# Patient Record
Sex: FEMALE | Race: ASIAN | NOT HISPANIC OR LATINO | Employment: FULL TIME | URBAN - METROPOLITAN AREA
[De-identification: names, ages, dates, MRNs, and addresses within clinical notes are randomized per-mention and may not be internally consistent; named-entity substitution may affect disease eponyms.]

---

## 2024-05-09 ENCOUNTER — ANCILLARY PROCEDURE (OUTPATIENT)
Dept: MATERNAL FETAL MEDICINE | Facility: MEDICAL CENTER | Age: 39
End: 2024-05-09
Attending: OBSTETRICS & GYNECOLOGY
Payer: MEDICAID

## 2024-05-09 VITALS
DIASTOLIC BLOOD PRESSURE: 68 MMHG | BODY MASS INDEX: 25.42 KG/M2 | WEIGHT: 143.5 LBS | SYSTOLIC BLOOD PRESSURE: 105 MMHG | HEART RATE: 72 BPM

## 2024-05-09 DIAGNOSIS — O09.899 SUPERVISION OF OTHER HIGH RISK PREGNANCIES, UNSPECIFIED TRIMESTER: ICD-10-CM

## 2024-05-09 DIAGNOSIS — O09.529 SUPERVISION OF MULTIGRAVIDA OF ADVANCED MATERNAL AGE, ANTEPARTUM: ICD-10-CM

## 2024-05-09 PROCEDURE — 76815 OB US LIMITED FETUS(S): CPT | Performed by: OBSTETRICS & GYNECOLOGY

## 2024-05-28 ENCOUNTER — HOSPITAL ENCOUNTER (INPATIENT)
Facility: MEDICAL CENTER | Age: 39
End: 2024-05-28
Attending: OBSTETRICS & GYNECOLOGY | Admitting: OBSTETRICS & GYNECOLOGY
Payer: MEDICAID

## 2024-05-28 ENCOUNTER — APPOINTMENT (OUTPATIENT)
Dept: RADIOLOGY | Facility: MEDICAL CENTER | Age: 39
DRG: 832 | End: 2024-05-28
Attending: OBSTETRICS & GYNECOLOGY
Payer: MEDICAID

## 2024-05-28 LAB
ALBUMIN SERPL BCP-MCNC: 3.7 G/DL (ref 3.2–4.9)
ALBUMIN/GLOB SERPL: 1 G/DL
ALP SERPL-CCNC: 63 U/L (ref 30–99)
ALT SERPL-CCNC: 7 U/L (ref 2–50)
ANION GAP SERPL CALC-SCNC: 13 MMOL/L (ref 7–16)
APPEARANCE UR: CLEAR
AST SERPL-CCNC: 10 U/L (ref 12–45)
BILIRUB SERPL-MCNC: 0.2 MG/DL (ref 0.1–1.5)
BUN SERPL-MCNC: 7 MG/DL (ref 8–22)
CALCIUM ALBUM COR SERPL-MCNC: 10.1 MG/DL (ref 8.5–10.5)
CALCIUM SERPL-MCNC: 9.9 MG/DL (ref 8.5–10.5)
CHLORIDE SERPL-SCNC: 103 MMOL/L (ref 96–112)
CO2 SERPL-SCNC: 19 MMOL/L (ref 20–33)
COLOR UR AUTO: YELLOW
CREAT SERPL-MCNC: 0.35 MG/DL (ref 0.5–1.4)
ERYTHROCYTE [DISTWIDTH] IN BLOOD BY AUTOMATED COUNT: 46.2 FL (ref 35.9–50)
GFR SERPLBLD CREATININE-BSD FMLA CKD-EPI: 134 ML/MIN/1.73 M 2
GLOBULIN SER CALC-MCNC: 3.8 G/DL (ref 1.9–3.5)
GLUCOSE SERPL-MCNC: 78 MG/DL (ref 65–99)
GLUCOSE UR QL STRIP.AUTO: NEGATIVE MG/DL
GP B STREP DNA SPEC QL NAA+PROBE: NEGATIVE
HCT VFR BLD AUTO: 37.9 % (ref 37–47)
HGB BLD-MCNC: 13 G/DL (ref 12–16)
HOLDING TUBE BB 8507: NORMAL
KETONES UR QL STRIP.AUTO: NEGATIVE MG/DL
LEUKOCYTE ESTERASE UR QL STRIP.AUTO: NEGATIVE
MCH RBC QN AUTO: 32.7 PG (ref 27–33)
MCHC RBC AUTO-ENTMCNC: 34.3 G/DL (ref 32.2–35.5)
MCV RBC AUTO: 95.5 FL (ref 81.4–97.8)
NITRITE UR QL STRIP.AUTO: NEGATIVE
PH UR STRIP.AUTO: 7 [PH] (ref 5–8)
PLATELET # BLD AUTO: 307 K/UL (ref 164–446)
PMV BLD AUTO: 9.4 FL (ref 9–12.9)
POTASSIUM SERPL-SCNC: 4.1 MMOL/L (ref 3.6–5.5)
PROT SERPL-MCNC: 7.5 G/DL (ref 6–8.2)
PROT UR QL STRIP: NEGATIVE MG/DL
RBC # BLD AUTO: 3.97 M/UL (ref 4.2–5.4)
RBC UR QL AUTO: ABNORMAL
SODIUM SERPL-SCNC: 135 MMOL/L (ref 135–145)
SP GR UR STRIP.AUTO: 1.01 (ref 1–1.03)
WBC # BLD AUTO: 10.2 K/UL (ref 4.8–10.8)

## 2024-05-28 PROCEDURE — 76817 TRANSVAGINAL US OBSTETRIC: CPT

## 2024-05-28 PROCEDURE — 770002 HCHG ROOM/CARE - OB PRIVATE (112)

## 2024-05-28 PROCEDURE — 87653 STREP B DNA AMP PROBE: CPT

## 2024-05-28 PROCEDURE — 36415 COLL VENOUS BLD VENIPUNCTURE: CPT

## 2024-05-28 PROCEDURE — 99254 IP/OBS CNSLTJ NEW/EST MOD 60: CPT | Performed by: OBSTETRICS & GYNECOLOGY

## 2024-05-28 PROCEDURE — 85027 COMPLETE CBC AUTOMATED: CPT

## 2024-05-28 PROCEDURE — 87081 CULTURE SCREEN ONLY: CPT

## 2024-05-28 PROCEDURE — 99222 1ST HOSP IP/OBS MODERATE 55: CPT | Performed by: OBSTETRICS & GYNECOLOGY

## 2024-05-28 PROCEDURE — 700111 HCHG RX REV CODE 636 W/ 250 OVERRIDE (IP): Performed by: OBSTETRICS & GYNECOLOGY

## 2024-05-28 PROCEDURE — 87150 DNA/RNA AMPLIFIED PROBE: CPT

## 2024-05-28 PROCEDURE — 302790 HCHG STAT ANTEPARTUM CARE, DAILY

## 2024-05-28 PROCEDURE — 96372 THER/PROPH/DIAG INJ SC/IM: CPT

## 2024-05-28 PROCEDURE — 81002 URINALYSIS NONAUTO W/O SCOPE: CPT

## 2024-05-28 PROCEDURE — 80053 COMPREHEN METABOLIC PANEL: CPT

## 2024-05-28 RX ORDER — BETAMETHASONE SODIUM PHOSPHATE AND BETAMETHASONE ACETATE 3; 3 MG/ML; MG/ML
12 INJECTION, SUSPENSION INTRA-ARTICULAR; INTRALESIONAL; INTRAMUSCULAR; SOFT TISSUE EVERY 24 HOURS
Status: COMPLETED | OUTPATIENT
Start: 2024-05-28 | End: 2024-05-29

## 2024-05-28 RX ORDER — VITAMIN A ACETATE, BETA CAROTENE, ASCORBIC ACID, CHOLECALCIFEROL, .ALPHA.-TOCOPHEROL ACETATE, DL-, THIAMINE MONONITRATE, RIBOFLAVIN, NIACINAMIDE, PYRIDOXINE HYDROCHLORIDE, FOLIC ACID, CYANOCOBALAMIN, CALCIUM CARBONATE, FERROUS FUMARATE, ZINC OXIDE, CUPRIC OXIDE 3080; 12; 120; 400; 1; 1.84; 3; 20; 22; 920; 25; 200; 27; 10; 2 [IU]/1; UG/1; MG/1; [IU]/1; MG/1; MG/1; MG/1; MG/1; MG/1; [IU]/1; MG/1; MG/1; MG/1; MG/1; MG/1
1 TABLET, FILM COATED ORAL
Status: DISCONTINUED | OUTPATIENT
Start: 2024-05-28 | End: 2024-05-28

## 2024-05-28 RX ORDER — ERGOCALCIFEROL 1.25 MG/1
CAPSULE, LIQUID FILLED ORAL DAILY
COMMUNITY

## 2024-05-28 RX ORDER — VITAMIN A ACETATE, BETA CAROTENE, ASCORBIC ACID, CHOLECALCIFEROL, .ALPHA.-TOCOPHEROL ACETATE, DL-, THIAMINE MONONITRATE, RIBOFLAVIN, NIACINAMIDE, PYRIDOXINE HYDROCHLORIDE, FOLIC ACID, CYANOCOBALAMIN, CALCIUM CARBONATE, FERROUS FUMARATE, ZINC OXIDE, CUPRIC OXIDE 3080; 12; 120; 400; 1; 1.84; 3; 20; 22; 920; 25; 200; 27; 10; 2 [IU]/1; UG/1; MG/1; [IU]/1; MG/1; MG/1; MG/1; MG/1; MG/1; [IU]/1; MG/1; MG/1; MG/1; MG/1; MG/1
1 TABLET, FILM COATED ORAL
Status: CANCELLED | OUTPATIENT
Start: 2024-05-28

## 2024-05-28 RX ORDER — VITAMIN A ACETATE, BETA CAROTENE, ASCORBIC ACID, CHOLECALCIFEROL, .ALPHA.-TOCOPHEROL ACETATE, DL-, THIAMINE MONONITRATE, RIBOFLAVIN, NIACINAMIDE, PYRIDOXINE HYDROCHLORIDE, FOLIC ACID, CYANOCOBALAMIN, CALCIUM CARBONATE, FERROUS FUMARATE, ZINC OXIDE, CUPRIC OXIDE 3080; 12; 120; 400; 1; 1.84; 3; 20; 22; 920; 25; 200; 27; 10; 2 [IU]/1; UG/1; MG/1; [IU]/1; MG/1; MG/1; MG/1; MG/1; MG/1; [IU]/1; MG/1; MG/1; MG/1; MG/1; MG/1
1 TABLET, FILM COATED ORAL
Status: DISCONTINUED | OUTPATIENT
Start: 2024-05-29 | End: 2024-06-01 | Stop reason: HOSPADM

## 2024-05-28 RX ORDER — PROGESTERONE 100 MG/1
100 CAPSULE ORAL EVERY EVENING
Status: CANCELLED | OUTPATIENT
Start: 2024-05-28

## 2024-05-28 RX ADMIN — BETAMETHASONE SODIUM PHOSPHATE AND BETAMETHASONE ACETATE 12 MG: 3; 3 INJECTION, SUSPENSION INTRA-ARTICULAR; INTRALESIONAL; INTRAMUSCULAR at 15:35

## 2024-05-28 ASSESSMENT — PATIENT HEALTH QUESTIONNAIRE - PHQ9
2. FEELING DOWN, DEPRESSED, IRRITABLE, OR HOPELESS: NOT AT ALL
1. LITTLE INTEREST OR PLEASURE IN DOING THINGS: NOT AT ALL
SUM OF ALL RESPONSES TO PHQ9 QUESTIONS 1 AND 2: 0
2. FEELING DOWN, DEPRESSED, IRRITABLE, OR HOPELESS: NOT AT ALL
SUM OF ALL RESPONSES TO PHQ9 QUESTIONS 1 AND 2: 0
1. LITTLE INTEREST OR PLEASURE IN DOING THINGS: NOT AT ALL

## 2024-05-28 ASSESSMENT — LIFESTYLE VARIABLES
TOTAL SCORE: 0
HOW MANY TIMES IN THE PAST YEAR HAVE YOU HAD 5 OR MORE DRINKS IN A DAY: 0
EVER_SMOKED: NEVER
TOTAL SCORE: 0
CONSUMPTION TOTAL: NEGATIVE
EVER FELT BAD OR GUILTY ABOUT YOUR DRINKING: NO
ALCOHOL_USE: NO
EVER HAD A DRINK FIRST THING IN THE MORNING TO STEADY YOUR NERVES TO GET RID OF A HANGOVER: NO
DOES PATIENT WANT TO STOP DRINKING: NO
ON A TYPICAL DAY WHEN YOU DRINK ALCOHOL HOW MANY DRINKS DO YOU HAVE: 0
HAVE PEOPLE ANNOYED YOU BY CRITICIZING YOUR DRINKING: NO
HAVE YOU EVER FELT YOU SHOULD CUT DOWN ON YOUR DRINKING: NO
TOTAL SCORE: 0
AVERAGE NUMBER OF DAYS PER WEEK YOU HAVE A DRINK CONTAINING ALCOHOL: 0

## 2024-05-28 ASSESSMENT — PAIN DESCRIPTION - PAIN TYPE
TYPE: ACUTE PAIN

## 2024-05-28 NOTE — PROGRESS NOTES
", 25.4 weeks (EDC ) presents to Labor and Delivery with c/o vaginal bleeding since  night. Pt reports bleeding has been \"on and off\" and has varied in color from bright red to brown. She states she has been using panty-liners and was having to change them frequently over the last two days. She reports that the bleeding is more mild today. Small amount of brown spotting was observed on toilet tissue by this RN. Pt reports +FM. She reports some cramping on R side of abdomen and some mild back pain. She reports no LOF aside from the VB. Pt has hx of two pre-term deliveries, one at 26 weeks and the other at 34 weeks. She reports taking nightly progesterone, states no known complications with this pregnancy. Monitors applied x2. VSS.      1139: Report given to Dr. Early, orders received for US.  1230: US at bedside.  1318: US complete, monitors applied x2.   1430: Dr. Early updated regarding US findings.   1449: Orders for admission received.  1855: Report given to ISHAN Ramirez.     "

## 2024-05-29 LAB
ALBUMIN SERPL BCP-MCNC: 3.9 G/DL (ref 3.2–4.9)
ALBUMIN/GLOB SERPL: 1 G/DL
ALP SERPL-CCNC: 62 U/L (ref 30–99)
ALT SERPL-CCNC: 10 U/L (ref 2–50)
ANION GAP SERPL CALC-SCNC: 14 MMOL/L (ref 7–16)
AST SERPL-CCNC: 12 U/L (ref 12–45)
BILIRUB SERPL-MCNC: <0.2 MG/DL (ref 0.1–1.5)
BUN SERPL-MCNC: 9 MG/DL (ref 8–22)
CALCIUM ALBUM COR SERPL-MCNC: 10 MG/DL (ref 8.5–10.5)
CALCIUM SERPL-MCNC: 9.9 MG/DL (ref 8.5–10.5)
CHLORIDE SERPL-SCNC: 100 MMOL/L (ref 96–112)
CO2 SERPL-SCNC: 19 MMOL/L (ref 20–33)
CREAT SERPL-MCNC: 0.34 MG/DL (ref 0.5–1.4)
ERYTHROCYTE [DISTWIDTH] IN BLOOD BY AUTOMATED COUNT: 45.3 FL (ref 35.9–50)
GFR SERPLBLD CREATININE-BSD FMLA CKD-EPI: 135 ML/MIN/1.73 M 2
GLOBULIN SER CALC-MCNC: 3.8 G/DL (ref 1.9–3.5)
GLUCOSE SERPL-MCNC: 92 MG/DL (ref 65–99)
GP B STREP DNA SPEC QL NAA+PROBE: NEGATIVE
HCT VFR BLD AUTO: 38.2 % (ref 37–47)
HGB BLD-MCNC: 13.1 G/DL (ref 12–16)
MAGNESIUM SERPL-MCNC: 5.5 MG/DL (ref 1.5–2.5)
MAGNESIUM SERPL-MCNC: 6 MG/DL (ref 1.5–2.5)
MCH RBC QN AUTO: 32.8 PG (ref 27–33)
MCHC RBC AUTO-ENTMCNC: 34.3 G/DL (ref 32.2–35.5)
MCV RBC AUTO: 95.5 FL (ref 81.4–97.8)
PLATELET # BLD AUTO: 331 K/UL (ref 164–446)
PMV BLD AUTO: 9.2 FL (ref 9–12.9)
POTASSIUM SERPL-SCNC: 4.2 MMOL/L (ref 3.6–5.5)
PROT SERPL-MCNC: 7.7 G/DL (ref 6–8.2)
RBC # BLD AUTO: 4 M/UL (ref 4.2–5.4)
SODIUM SERPL-SCNC: 133 MMOL/L (ref 135–145)
WBC # BLD AUTO: 16.4 K/UL (ref 4.8–10.8)

## 2024-05-29 PROCEDURE — 700111 HCHG RX REV CODE 636 W/ 250 OVERRIDE (IP): Mod: JZ | Performed by: OBSTETRICS & GYNECOLOGY

## 2024-05-29 PROCEDURE — 80053 COMPREHEN METABOLIC PANEL: CPT

## 2024-05-29 PROCEDURE — 700105 HCHG RX REV CODE 258: Performed by: OBSTETRICS & GYNECOLOGY

## 2024-05-29 PROCEDURE — 96372 THER/PROPH/DIAG INJ SC/IM: CPT

## 2024-05-29 PROCEDURE — 700111 HCHG RX REV CODE 636 W/ 250 OVERRIDE (IP): Performed by: OBSTETRICS & GYNECOLOGY

## 2024-05-29 PROCEDURE — 85027 COMPLETE CBC AUTOMATED: CPT

## 2024-05-29 PROCEDURE — 302790 HCHG STAT ANTEPARTUM CARE, DAILY

## 2024-05-29 PROCEDURE — 700102 HCHG RX REV CODE 250 W/ 637 OVERRIDE(OP): Performed by: OBSTETRICS & GYNECOLOGY

## 2024-05-29 PROCEDURE — 36415 COLL VENOUS BLD VENIPUNCTURE: CPT

## 2024-05-29 PROCEDURE — A9270 NON-COVERED ITEM OR SERVICE: HCPCS | Performed by: OBSTETRICS & GYNECOLOGY

## 2024-05-29 PROCEDURE — 96375 TX/PRO/DX INJ NEW DRUG ADDON: CPT

## 2024-05-29 PROCEDURE — 770002 HCHG ROOM/CARE - OB PRIVATE (112)

## 2024-05-29 PROCEDURE — 59025 FETAL NON-STRESS TEST: CPT | Mod: 26 | Performed by: OBSTETRICS & GYNECOLOGY

## 2024-05-29 PROCEDURE — 83735 ASSAY OF MAGNESIUM: CPT | Mod: 91

## 2024-05-29 PROCEDURE — 99232 SBSQ HOSP IP/OBS MODERATE 35: CPT | Mod: 25 | Performed by: OBSTETRICS & GYNECOLOGY

## 2024-05-29 PROCEDURE — 96365 THER/PROPH/DIAG IV INF INIT: CPT

## 2024-05-29 PROCEDURE — 96366 THER/PROPH/DIAG IV INF ADDON: CPT

## 2024-05-29 RX ORDER — SODIUM CHLORIDE, SODIUM LACTATE, POTASSIUM CHLORIDE, CALCIUM CHLORIDE 600; 310; 30; 20 MG/100ML; MG/100ML; MG/100ML; MG/100ML
INJECTION, SOLUTION INTRAVENOUS CONTINUOUS
Status: DISCONTINUED | OUTPATIENT
Start: 2024-05-29 | End: 2024-06-01 | Stop reason: HOSPADM

## 2024-05-29 RX ORDER — MAGNESIUM SULFATE HEPTAHYDRATE 40 MG/ML
2 INJECTION, SOLUTION INTRAVENOUS CONTINUOUS
Status: DISCONTINUED | OUTPATIENT
Start: 2024-05-29 | End: 2024-05-29

## 2024-05-29 RX ORDER — CALCIUM GLUCONATE 94 MG/ML
1 INJECTION, SOLUTION INTRAVENOUS
Status: DISCONTINUED | OUTPATIENT
Start: 2024-05-29 | End: 2024-06-01 | Stop reason: HOSPADM

## 2024-05-29 RX ORDER — ONDANSETRON 2 MG/ML
4 INJECTION INTRAMUSCULAR; INTRAVENOUS EVERY 4 HOURS PRN
Status: DISCONTINUED | OUTPATIENT
Start: 2024-05-29 | End: 2024-06-01 | Stop reason: HOSPADM

## 2024-05-29 RX ORDER — MAGNESIUM SULFATE HEPTAHYDRATE 40 MG/ML
4 INJECTION, SOLUTION INTRAVENOUS ONCE
Status: COMPLETED | OUTPATIENT
Start: 2024-05-29 | End: 2024-05-29

## 2024-05-29 RX ORDER — MAGNESIUM SULFATE HEPTAHYDRATE 40 MG/ML
1 INJECTION, SOLUTION INTRAVENOUS CONTINUOUS
Status: DISPENSED | OUTPATIENT
Start: 2024-05-29 | End: 2024-05-30

## 2024-05-29 RX ADMIN — MAGNESIUM SULFATE HEPTAHYDRATE 4 G: 4 INJECTION, SOLUTION INTRAVENOUS at 09:01

## 2024-05-29 RX ADMIN — ONDANSETRON 4 MG: 2 INJECTION INTRAMUSCULAR; INTRAVENOUS at 09:23

## 2024-05-29 RX ADMIN — SODIUM CHLORIDE, POTASSIUM CHLORIDE, SODIUM LACTATE AND CALCIUM CHLORIDE: 600; 310; 30; 20 INJECTION, SOLUTION INTRAVENOUS at 08:54

## 2024-05-29 RX ADMIN — BETAMETHASONE SODIUM PHOSPHATE AND BETAMETHASONE ACETATE 12 MG: 3; 3 INJECTION, SUSPENSION INTRA-ARTICULAR; INTRALESIONAL; INTRAMUSCULAR at 15:30

## 2024-05-29 RX ADMIN — MAGNESIUM SULFATE IN WATER 2 G/HR: 40 INJECTION, SOLUTION INTRAVENOUS at 09:23

## 2024-05-29 RX ADMIN — MAGNESIUM SULFATE IN WATER 2.5 G/HR: 40 INJECTION, SOLUTION INTRAVENOUS at 17:14

## 2024-05-29 RX ADMIN — SODIUM CHLORIDE, POTASSIUM CHLORIDE, SODIUM LACTATE AND CALCIUM CHLORIDE: 600; 310; 30; 20 INJECTION, SOLUTION INTRAVENOUS at 22:31

## 2024-05-29 RX ADMIN — PRENATAL WITH FERROUS FUM AND FOLIC ACID 1 TABLET: 3080; 920; 120; 400; 22; 1.84; 3; 20; 10; 1; 12; 200; 27; 25; 2 TABLET ORAL at 07:32

## 2024-05-29 ASSESSMENT — PATIENT HEALTH QUESTIONNAIRE - PHQ9
2. FEELING DOWN, DEPRESSED, IRRITABLE, OR HOPELESS: NOT AT ALL
1. LITTLE INTEREST OR PLEASURE IN DOING THINGS: NOT AT ALL
SUM OF ALL RESPONSES TO PHQ9 QUESTIONS 1 AND 2: 0

## 2024-05-29 ASSESSMENT — PAIN DESCRIPTION - PAIN TYPE
TYPE: ACUTE PAIN
TYPE: ACUTE PAIN

## 2024-05-29 NOTE — PROGRESS NOTES
0700- Bedside report received from ISHAN Ramirez. POC discussed. Patient denies pain or needs at this time.     0725- Assessment completed, patient reports vaginal spotting and light vaginal bleeding that is bright red when she wipes after getting up to use the restroom. Patient denies uterine contractions, denies LOF, endorses positive fetal movement. Encouraged to update RN with changes in status and increases in vaginal bleeding. Verbalized understanding. EFM & toco adjusted.     0830- Late entry due to patient care. Orders received to begin magnesium sulfate for neuroprotection, patient updated and verbalized understanding and agreement with plan. See eMar. Whitlock inserted per flowsheets, SCDs placed.    0990- Dr. Donovan updated via telephone, will contact NICU for consult.    1914- Bedside report given to ISHAN Ramirez.

## 2024-05-29 NOTE — H&P
DATE OF ADMISSION:  2024     OB-GYN ADMISSION HISTORY AND PHYSICAL     IDENTIFICATION:  This is a 39-year-old  6, para 1-2-2-3 with an EDC of   2024 and a presenting EGA of 25 and 4/7th weeks who has a chief   complaint of vaginal bleeding.     HISTORY OF PRESENT ILLNESS:  This is a patient of Dr. Robbins who has   gotten good prenatal care.  She is also seen by Dr. Jones.  She presented on the    complaining of vaginal bleeding since , bright red in nature at   times then dark brown.  She was concerned, subsequently, came to labor and   delivery.  She has been on progesterone during the course of her pregnancy and   cervical length has been followed by Dr. Jones.  Her most recent cervical   length was 3.7 cm.  She denies spontaneous rupture of membranes.  Denies   uterine contractions.  The patient has a history of 2 miscarriages, one in    and  as well as   in  at 26 weeks,     in  at 34 weeks and a term  in .  Ultrasound on the    shows vertex presentation, fetus 756 grams with a 2.41 cm transvaginal   length with some funneling and fluid in the endocervical canal as well as a   placental lake.  The patient has no other complaints.  Denies nausea,   vomiting, fever, chills, change in bowel or bladder habits.  She admits to   good fetal movement.  Denies symptoms of PIH.  She does state that she has a   bicornuate uterus and she carries BRCA gene.  She has never had genetics for   this carrier status.  The patient had gestational diabetes with prior   pregnancy.  She had an early elevated 1-hour, normal 3-hour with this   pregnancy and is waiting to repeat her one-hour with this pregnancy.     OBSTETRICAL HISTORY:  2005 spontaneous ; 2007 spontaneous ;   , 26 week ; , 34 week ;  full term  and   the current pregnancy.     GYNECOLOGIC HISTORY:  Bicornuate uterus:      MEDICAL HISTORY:    ALLERGIES:  No known drug allergies.     CURRENT MEDICATIONS:  Vitamin D, prenatal vitamins vaginal progesterone.     MEDICAL PROBLEMS:  BRCA carrier.     SURGICAL HISTORY:   x3.     SOCIAL HISTORY:  Denies alcohol, tobacco or drug abuse.     FAMILY HISTORY:  Noncontributory.     REVIEW OF SYSTEMS:  Times 12 is negative per AMA standards available in chart.     LABORATORY DATA:  Beta strep is negative.  She is A positive, rubella immune.    She declined genetics.  She had an elevated early 1-hour, normal 3-hour,   second 1-hour is pending.     PHYSICAL EXAMINATION:    VITAL SIGNS:  The patient is afebrile.  Vital signs within normal limits.    Fetal heart tracings appropriate for gestational age with moderate variability   in the 130s.  GENERAL:  She is awake, alert, in no apparent distress.  NECK:  Supple.  HEART:  Regular.  CHEST:  Clear.  BREASTS:  Symmetrical.  ABDOMEN:  Soft, gravid, size appropriate dates.  EXTREMITIES:  Negative.  GYNECOLOGIC:  EGBUS within normal limits.  No perineal lesions.  Vagina is   pink and moist.  Cervix is midline closed, 70% effaced, -3 station, head   presenting.     ASSESSMENT:  At this time,  1.  A 39-year-old  6, para 1-2-2-3 at 25 and 4/7th weeks.  2.  Vaginal bleeding with shortened cervix.  3.  Placental lake, rule out abruption.  4.  Fetal status reassuring.  5.  Bicornuate uterus.  6.  History of  labor and delivery x2.  7.  Prior  x3.  8.  BRCA gene carrier.     PLAN:  At this time, we discussed the case with Dr. Jones.  He recommends a   course of steroids, continuous monitoring and observation, mag for neuro   protection with onset of uterine contractions with possible discharge home in   her steroid window if she no longer bleeding with close followup.           ______________________________  MD ROD Best/RAMEZ    DD:  2024 06:31  DT:  2024 07:29    Job#:  [id number]

## 2024-05-29 NOTE — CARE PLAN
The patient is Stable - Low risk of patient condition declining or worsening    Shift Goals  Clinical Goals: Pt to remain free from s/s PTL, remain pregnant  Patient Goals: Remain pregnant  Family Goals: Support    Progress made toward(s) clinical / shift goals:    Problem: Knowledge Deficit - L&D  Goal: Patient and family/caregivers will demonstrate understanding of plan of care, disease process/condition, diagnostic tests and medications  Outcome: Progressing  Problem: Psychosocial - L&D  Goal: Patient's level of anxiety will decrease  Outcome: Progressing  Pt updated regarding plan of care for admission. All questions and concerns addressed at this time.    Problem: Pain  Goal: Patient's pain will be alleviated or reduced to the patient’s comfort goal  Outcome: Progressing  Pt reports no pain at this time. Encouraged to call RN if she feels cramping has returned.     Patient is not progressing towards the following goals: NA

## 2024-05-29 NOTE — CONSULTS
NICU Consult    I was asked to consult on Ms. Nichols today. She is a 39 year old  at 25 weeks and 5 days gestation whose pregnancy was complicated by suspected placental abruption and uterine contractions. I met with Ms. Nichols and she is expecting a baby boy. EFW of 756g. Obstretical plan for betamethasone (, ) and magnesium. Of note, she had a prior 26 week and 34 week infant.     Today we discussed the average length of stay in the NICU, the differences in delivery room management for a ELBW premature infant, the need for likely mechanical ventilation for significant respiratory support, the overall nutritional strategy for infant's of this gestational age, including TPN via umbilical catheters/PICC lines as well as slow ramping up of enteral feeds. Mother does plan to breastfeed, which I supported and encouraged, and we discussed the importance of breast milk/donor breastmilk to premature infants.    Additionally, we discussed the overall immaturity of infants born at this age and the monitoring that would take place for the immature brain, eyes, lungs, intestines, and overall neurodevelopment, as ELBW infants are at increased risk for cognitive delays, CP, attention issues, and other developmental problems, that cannot be predicted at this juncture. We also talked about the overall survival for an infant born at this gestational age following steriods. I informed them that this survival rate increases with increasing gestational age. We also discussed some statistics for survival without serious neurologic sequale.     All questions were answered. ?     NICU remains available should further questions or concerns arise.    Sandrine Yeboah MD  Neonatology

## 2024-05-29 NOTE — PROGRESS NOTES
"  Labor and Delivery Antepartum Note    ID: 39 y.o. is a  with IUP at 25w5d    Primary Obstetrician: Sandrine Montenegro M.D.    Assessing Obstetrician: Sandrine Montenegro MD    S: Pt feels well. States no painful contractions but occas feels discomfort on the sides of her lower abdomen. Last vaginal bleeding this morning was bright red and brown, small amount of spotting on pad. No LOF. Baby moving well. Questions answered about her current condition and plan of care.    ROS: 10 systems negative except as noted above.    O: /58   Pulse 76   Temp 36 °C (96.8 °F) (Temporal)   Resp 18   Ht 1.6 m (5' 3\")   Wt 66.2 kg (146 lb)   SpO2 97%   BMI 25.86 kg/m²    Gen: NAD, AAO  HEENT: NC/AT, MMM  Neck: supple  Abd: Gravid, soft, uterus NTTP, no rebound/guarding  Ext: WWP, 2+ DPP, no edema  Skin: no visible rash  Psy: mood good, affect normal and congruent  Pelvic: SVE deferred    NST Report Review:  NST: 120-130, pos accels, moderate variability, no decels, reactive, cat 1  [place]: q 3 min -> placed on mag -> now irregular off and on and Q 7 min off and on    Recent Labs     24  1530   WBC 10.2   RBC 3.97*   HEMOGLOBIN 13.0   HEMATOCRIT 37.9   MCV 95.5   MCH 32.7   RDW 46.2   PLATELETCT 307   MPV 9.4       Assessment:   Raisa Nichols is a 39 y.o.  at 25w5d.    Vaginal bleeding - probable marginal abruption  Cervical shortening and funneling  Hx of  birth x 2 at 26 and 34 weeks  Prior  x 3  Bicornuate uterus  Reactive NST  BRCA 2 carrier  AMA    Plan:  Continue antepartum mgmt  Mag sulfate for tocolysis through BMZ window  Pt is s/p BMZ x 1, due for 2nd dose today  NICU consult called today, spoke w/ charge RN  CFM/toco  Appreciate perinatology consultation  Pt planning ultimately on delivery by repeat  with BSO (due to BRCA 2 carrier status)  Indications for delivery discussed with patient in detail      Sandrine Montenegro M.D., 2024, 11:35 AM     "

## 2024-05-29 NOTE — PROGRESS NOTES
1855 - Report received from ISHAN Lakhani  1912 - PC to Dr. Jones. MD notified that pt is on night;y preogesterone. Per Dr. Jones, hold progesterone for now  1945 - Assessment completed. Reports good fm. Denies any ctx or abd pain. Small amt of red vag bleeding noted on pad  0500 - pt denies feeling ctx  0655 - Reort given to ISHAN Young

## 2024-05-29 NOTE — CONSULTS
DATE OF SERVICE:  2024     REASON FOR CONSULTATION:  Vaginal bleeding.     REQUESTING PHYSICIAN:  Abdirizak Early MD.     HISTORY OF PRESENT ILLNESS:  This is a 38-year-old  6, para 3, AB 2,   currently with a working EDC of 2024, admitted today at 25 weeks and 4   days with a 2-day history of vaginal bleeding.  The patient reports onset of   bleeding was on  and she was soaking a mini pad.  She denies any   cramping, although she felt some left-sided achiness, but was episodic and has   since resolved.  Today, she has had minimal bleeding.     The patient is known to our practice as we have seen her previously in   consultation for advanced maternal age and history of  birth. The   patient had in a previous pregnancy a placental abruption.     The patient's last cervical examination by us was normal.  Dr. Early reports   today's ultrasound demonstrated cervical length of 24 mm with funneling.  This   pelvic examination revealed the cervix to be closed.     PAST MEDICAL HISTORY:  Denies any major medical problems including asthma,   hypertension, seizures, diabetes, cardiovascular, respiratory, GI, ,   endocrine disorders.     PREVIOUS OPERATIONS:   section x3.     TRANSFUSIONS:  None.     ALLERGIES:  None.     HABITS:  None.     CURRENT MEDICATIONS:  Progesterone suppositories.     VENEREAL DISEASE HISTORY:  Negative.     PAST OBSTETRICAL HISTORY:  She had two spontaneous ABs and one  birth   in  at 26 weeks, female, 1 pound  section,  labor predated   by vaginal bleeding.  Subsequently, 2 fairly uncomplicated pregnancies.     PHYSICAL EXAMINATION:    GENERAL:  Well-developed, well-nourished female, alert and oriented x3, in no   acute distress.  ABDOMEN:  Soft, no guarding, rigidity.  PELVIC:  Fetal heart rate tracing reviewed by me, reveals baseline over 140   beats per minute with accelerations present. No decelerations or contractions.      ASSESSMENT:    1.  Intrauterine pregnancy at 25 weeks 4 days.  2.  Probable placental abruption, marginal.  3.  Prior  birth.  4.  Previous  section.     PLAN:  At the present time, fetal heart rate tracing is reassuring and that   expectant management would be recommended.  Betamethasone has been   administered first dose and she will receive another one in 24 hours.  If   there should be an onset of uterine contractions, then magnesium sulfate   should be started for neuro protection as well as control of uterine   contractions.     If she should remain stable with no active bleeding for at least 24-48 hours,   then she can be managed as an outpatient.  Otherwise, she should remain in the   hospital under expectant management.  The patient was accompanied by her   partner and management plan was discussed with her and her partner.  All   questions answered to satisfaction.    New inpatient hospital consultation with high complexity MDM      ______________________________  MD NATALIA LANGSTON/ZOE    DD:  2024 18:47  DT:  2024 19:03    Job#:  [id number]

## 2024-05-29 NOTE — H&P
DATE OF ADMISSION:  2024     OB-GYN ADMISSION HISTORY AND PHYSICAL     IDENTIFICATION:  This is a 39-year-old  6, para 1-2-2-3 with an EDC of   2024, EGA of 25 and 4/7th weeks who presents with chief complaint of   vaginal bleeding.     HISTORY OF PRESENT ILLNESS:  This is a patient of Dr. Robbins who has   gotten good prenatal care.  She also sees Dr. Jones.  She does have a history of   bicornuate uterus and 2 prior  deliveries with 3 prior C-sections.    Her most recent pregnancy was full term.  She presents today complaining of   vaginal bleeding since , which has waxed and waned.  She denies uterine   contractions, spontaneous rupture of membranes.  She admits to good fetal   movement.  She states that she has been using her progesterone in the   evenings, decided to come to labor and delivery as she was concerned about the   bleeding.  She has been followed by Dr. Jones.  Her most recent cervical exam   in his office was 3.7 cm.  Here in labor and delivery, fetal heart tracings   reactive with appropriate variability for 25 and 4/7th weeks.  She is not   mary.  Ultrasound here today shows a fundal posterior placenta with a   placental lake no obvious abruption and a 2.41 cm transvaginal cervical length   with a small amount of fluid in the endocervical canal and some funneling   present.  Fetus measures 756 grams in a vertex presentation.  The patient has   no other complaints.  Denies nausea, vomiting, fever, chills, change in bowel   or bladder habits.  Denies any recent trauma, exercise or intercourse.  She   does desire a  with this pregnancy and also desires to have her tubes   removed at the time of surgery.     OBSTETRICAL HISTORY:  Significant for 2005 spontaneous ,    spontaneous ,  26 week  section for  labor with   vaginal bleeding,  34 week  section for  labor and     at term.      GYNECOLOGIC HISTORY:  Bicornuate uterus, BRCA2 Gene carrier, no genetics.     MEDICAL HISTORY.  ALLERGIES:  None.     CURRENT MEDICATIONS:  Prenatal vitamins, baby aspirin, vaginal progesterone.     MEDICAL HISTORY: None.     SURGICAL HISTORY:  Three prior C-sections at 26, 34 and full term   respectively.     SOCIAL HISTORY:  Denies alcohol, tobacco or drug abuse.     FAMILY HISTORY:  Noncontributory.     REVIEW OF SYSTEMS:  Times 12 is negative per AMA standards available in chart.     LABORATORY DATA:  She is A positive, rubella immune.  She had an elevated   early 1-hour, normal early 3-hour.  Beta strep is pending.     PHYSICAL EXAMINATION:   VITAL SIGNS:  The patient is afebrile.  Vital signs within normal limits.    Fetal heart tracings reactive, category 1.  She is not mary.  GENERAL:  She is awake, alert, in no apparent distress.  NECK:  Supple.  HEART:  Regular.  CHEST:  Clear.  BREASTS:  Symmetrical.  ABDOMEN:  Soft, gravid, size appropriate for dates.  EXTREMITIES:  Negative.  GYNECOLOGIC:  EGBUS within normal limits.  No perineal lesions.  Vagina is   pink and moist.  Cervix is closed, 70% effaced, -2 station by my exam, vertex   presentation.  Uterus midline, size appropriate for dates.  No adnexal masses.     ASSESSMENT:  At this time:  1.  A 39-year-old  6, para 1-2-2-3 at 25 and 4/7th weeks.  2.  Vaginal bleeding with cervical shortening and funneling.  3.  Bicornuate uterus.  4.  History of  labor and delivery at 26 and 34 weeks.  5.  Prior  x3.  6.  BRCA carrier.  7.  Fetal status reassuring.     PLAN:  At this time, discussed the case with Dr. Jones.  He recommends admission   and hydration.  He will come and consult on the patient. In the meantime, we   will do Beta Strep Culture and start her on steroids. If there is any evidence   of  labor, we will start mag.  Otherwise, we will do continuous   monitoring for now with a regular diet.  Tylenol ordered,  discussed with the   patient the current course of care.  She and her spouse agree as such.        ______________________________  MD ROD Best/PAT    DD:  05/28/2024 15:14  DT:  05/28/2024 16:44    Job#:  [id number]

## 2024-05-29 NOTE — CARE PLAN
The patient is Watcher - Medium risk of patient condition declining or worsening    Shift Goals  Clinical Goals: stay pregnant, monitor vaginal bleeding, fetal monitoring  Patient Goals: remain pregnant  Family Goals: support    Progress made toward(s) clinical / shift goals:      Progressing.       Problem: Knowledge Deficit - L&D  Goal: Patient and family/caregivers will demonstrate understanding of plan of care, disease process/condition, diagnostic tests and medications  Outcome: Progressing  Note: Plan of care discussed, patient updated on plan of care and things to update staff on. Verbalized understanding.      Problem: Risk for Excess Fluid Volume  Goal: Patient will demonstrate pulse, blood pressure and neurologic signs within expected ranges and without any respiratory complications  Outcome: Progressing     Problem: Psychosocial - L&D  Goal: Patient's level of anxiety will decrease  Outcome: Progressing  Note: Emotional support during hospitalization discussed and provided, patient verbalized understanding of plan of care and encouraged to ask questions as they arise.       Patient is not progressing towards the following goals:    N/a.

## 2024-05-29 NOTE — PROGRESS NOTES
"[facility] CONSULTATION:  S: Pt reports bleeding has continued.    O: Patient Vitals for the past 24 hrs:   BP Temp Temp src Pulse Resp SpO2 Height Weight   24 0730 108/68 36 °C (96.8 °F) Temporal 66 18 93 % -- --   24 2017 110/59 36.7 °C (98.1 °F) Temporal 72 16 94 % -- --   24 1600 111/67 36.5 °C (97.7 °F) Temporal 69 16 95 % -- --   24 1108 115/66 36.1 °C (97 °F) Temporal 78 16 96 % 1.6 m (5' 3\") 66.2 kg (146 lb)     EFM: Baseline l30 bpm. Moderate variability and accelerations present.  Contractions every 3 minutes.  No decelerations.    A: IUP 25  5/7 weeks.      Placental abruption      Uterine contractions      Previous  section.  P: Begin magnesium sulfate for neuroprotection.    High complex hospital follow up.  "

## 2024-05-29 NOTE — NST NOTE
Ainalyn Roscom Razonable   Gestational age: 25w5d     Indication: Abruption    NST Interpretation:  Baseline 130, Reactive  Moderate variability and accelerations.  No decelerations.  Contractions every 3 minutes.

## 2024-05-29 NOTE — CARE PLAN
Problem: Psychosocial - L&D  Goal: Patient's level of anxiety will decrease  Outcome: Progressing  Note: Pt and S.O. encouraged to ask questions as they arise and voice any concerns     Problem: Pain  Goal: Patient's pain will be alleviated or reduced to the patient’s comfort goal  Outcome: Progressing  Note: Pt denies any abd pain, ctx or leaking of fluid. Instructed to notify RN if they develop   The patient is Stable - Low risk of patient condition declining or worsening    Shift Goals  Clinical Goals: prolong pregnancy, monitor vag bleeding  Patient Goals: Remain pregnant  Family Goals: support    Progress made toward(s) clinical / shift goals:  Small amt of red vag bleeding noted on pad. Pt denies any pain but will notify RN if they develop. FHR reassuring    Patient is not progressing towards the following goals:N/A

## 2024-05-30 ENCOUNTER — APPOINTMENT (OUTPATIENT)
Dept: MATERNAL FETAL MEDICINE | Facility: MEDICAL CENTER | Age: 39
End: 2024-05-30
Attending: OBSTETRICS & GYNECOLOGY
Payer: MEDICAID

## 2024-05-30 LAB
MAGNESIUM SERPL-MCNC: 3.4 MG/DL (ref 1.5–2.5)
MAGNESIUM SERPL-MCNC: 5.5 MG/DL (ref 1.5–2.5)
MAGNESIUM SERPL-MCNC: 6.9 MG/DL (ref 1.5–2.5)

## 2024-05-30 PROCEDURE — 36415 COLL VENOUS BLD VENIPUNCTURE: CPT

## 2024-05-30 PROCEDURE — A9270 NON-COVERED ITEM OR SERVICE: HCPCS | Performed by: OBSTETRICS & GYNECOLOGY

## 2024-05-30 PROCEDURE — 700111 HCHG RX REV CODE 636 W/ 250 OVERRIDE (IP): Performed by: OBSTETRICS & GYNECOLOGY

## 2024-05-30 PROCEDURE — 700102 HCHG RX REV CODE 250 W/ 637 OVERRIDE(OP): Performed by: OBSTETRICS & GYNECOLOGY

## 2024-05-30 PROCEDURE — 770002 HCHG ROOM/CARE - OB PRIVATE (112)

## 2024-05-30 PROCEDURE — 99232 SBSQ HOSP IP/OBS MODERATE 35: CPT | Mod: 25 | Performed by: OBSTETRICS & GYNECOLOGY

## 2024-05-30 PROCEDURE — 83735 ASSAY OF MAGNESIUM: CPT

## 2024-05-30 PROCEDURE — 302790 HCHG STAT ANTEPARTUM CARE, DAILY

## 2024-05-30 PROCEDURE — 700105 HCHG RX REV CODE 258: Performed by: OBSTETRICS & GYNECOLOGY

## 2024-05-30 PROCEDURE — 96366 THER/PROPH/DIAG IV INF ADDON: CPT

## 2024-05-30 PROCEDURE — 59025 FETAL NON-STRESS TEST: CPT | Mod: 26 | Performed by: OBSTETRICS & GYNECOLOGY

## 2024-05-30 RX ORDER — DOCUSATE SODIUM 100 MG/1
100 CAPSULE, LIQUID FILLED ORAL 2 TIMES DAILY
Status: DISCONTINUED | OUTPATIENT
Start: 2024-05-30 | End: 2024-06-01 | Stop reason: HOSPADM

## 2024-05-30 RX ORDER — POLYETHYLENE GLYCOL 3350 17 G/17G
1 POWDER, FOR SOLUTION ORAL DAILY
Status: DISCONTINUED | OUTPATIENT
Start: 2024-05-30 | End: 2024-06-01 | Stop reason: HOSPADM

## 2024-05-30 RX ADMIN — MAGNESIUM SULFATE IN WATER 2.5 G/HR: 40 INJECTION, SOLUTION INTRAVENOUS at 01:50

## 2024-05-30 RX ADMIN — SODIUM CHLORIDE, POTASSIUM CHLORIDE, SODIUM LACTATE AND CALCIUM CHLORIDE: 600; 310; 30; 20 INJECTION, SOLUTION INTRAVENOUS at 07:22

## 2024-05-30 RX ADMIN — PRENATAL WITH FERROUS FUM AND FOLIC ACID 1 TABLET: 3080; 920; 120; 400; 22; 1.84; 3; 20; 10; 1; 12; 200; 27; 25; 2 TABLET ORAL at 07:48

## 2024-05-30 RX ADMIN — DOCUSATE SODIUM 100 MG: 100 CAPSULE, LIQUID FILLED ORAL at 09:42

## 2024-05-30 RX ADMIN — POLYETHYLENE GLYCOL 3350 1 PACKET: 17 POWDER, FOR SOLUTION ORAL at 09:43

## 2024-05-30 RX ADMIN — DOCUSATE SODIUM 100 MG: 100 CAPSULE, LIQUID FILLED ORAL at 17:49

## 2024-05-30 ASSESSMENT — PAIN DESCRIPTION - PAIN TYPE: TYPE: ACUTE PAIN

## 2024-05-30 NOTE — PROGRESS NOTES
"0700 - Report received from ISHAN Ramirez. POC discussed. Care assumed.  0751 - Pt reports +FM. Pt denies LOF. Pt states she had some \"brown\" discharge on her toilet paper this morning with no reports of fresh blood. Pt reports feeling L.sided abdominal cramping that \"comes and goes\". No other c/o of pain. Dr. Jones at . Orders received to discontinue Magnesium infusion at this time (see MAR).  0820 - Dr. Medrano at  to assess pt and discuss POC. Orders received.  1855 - Report given to ISHAN Ramirez.   "

## 2024-05-30 NOTE — NST NOTE
Ainalyn Roscom Razonable   Gestational age: 25w6d     Indication: Placental abruption    NST Interpretation:  Baseline 120, Reactive with minimal variability.  Accelerations present.  No decelerations or contractions.

## 2024-05-30 NOTE — CARE PLAN
Problem: Psychosocial - L&D  Goal: Patient's level of anxiety will decrease  Outcome: Progressing  Note: Pt encouraged to ask questions and voice concerns as they arise     Problem: Pain  Goal: Patient's pain will be alleviated or reduced to the patient’s comfort goal  Outcome: Progressing  Note: Pt c/o feeling crampy but denies feeling ctx. Will notify RN if they develop   The patient is Watcher - Medium risk of patient condition declining or worsening    Shift Goals  Clinical Goals: prolong pregnancy, monitor bleeding and ctx  Patient Goals: stay pregnant  Family Goals: support    Progress made toward(s) clinical / shift goals:  scant red vag bleeding noted and occasional ctx noted on monitor    Patient is not progressing towards the following goals:N/A

## 2024-05-30 NOTE — PROGRESS NOTES
S: Reports old blood.    O: Patient Vitals for the past 24 hrs:   BP Temp Temp src Pulse Resp SpO2   05/30/24 0600 -- -- -- 73 -- 95 %   05/30/24 0540 111/67 -- -- 75 -- 96 %   05/30/24 0500 -- -- -- 72 -- 96 %   05/30/24 0440 104/63 -- -- 67 -- 96 %   05/30/24 0400 -- -- -- 64 -- 96 %   05/30/24 0340 111/67 36.1 °C (97 °F) Temporal 67 18 95 %   05/30/24 0300 -- -- -- 69 -- 95 %   05/30/24 0245 107/64 -- -- 70 -- 95 %   05/30/24 0200 -- -- -- 68 -- 97 %   05/30/24 0140 107/71 -- -- 71 -- 96 %   05/30/24 0100 -- -- -- 67 -- 96 %   05/30/24 0040 107/64 -- -- 67 -- 96 %   05/30/24 0020 -- -- -- 65 -- 95 %   05/30/24 0010 -- -- -- 65 -- 96 %   05/30/24 0000 -- -- -- 64 -- 95 %   05/29/24 2350 -- -- -- 66 -- 95 %   05/29/24 2340 99/58 36.1 °C (97 °F) Temporal 75 18 95 %   05/29/24 2330 -- -- -- 66 -- 96 %   05/29/24 2320 -- -- -- 66 -- 96 %   05/29/24 2310 -- -- -- 66 -- 96 %   05/29/24 2300 -- -- -- 72 -- 96 %   05/29/24 2250 -- -- -- 67 -- 95 %   05/29/24 2240 109/67 -- -- 67 -- 95 %   05/29/24 2230 -- -- -- 68 -- 95 %   05/29/24 2220 -- -- -- 79 -- 94 %   05/29/24 2210 -- -- -- 76 -- 96 %   05/29/24 2200 -- -- -- 70 -- 96 %   05/29/24 2140 105/63 -- -- 71 -- 95 %   05/29/24 2040 108/63 -- -- 77 -- 93 %   05/29/24 2000 -- -- -- 86 -- 93 %   05/29/24 1945 118/74 36.3 °C (97.4 °F) Temporal 77 18 94 %   05/29/24 1900 -- -- -- 64 -- 94 %   05/29/24 1842 108/61 -- -- 69 -- 95 %   05/29/24 1800 110/62 -- -- 67 -- 95 %   05/29/24 1645 112/69 -- -- 68 -- 95 %   05/29/24 1545 115/70 36.2 °C (97.1 °F) Temporal 85 -- 94 %   05/29/24 1445 108/64 -- -- 73 -- 95 %   05/29/24 1400 105/62 -- -- 75 -- 95 %   05/29/24 1300 (!) 149/76 -- -- 72 -- 96 %   05/29/24 1200 111/67 36.2 °C (97.2 °F) Temporal 72 18 96 %   05/29/24 1130 114/56 -- -- 76 -- 96 %   05/29/24 1115 124/72 -- -- 82 -- 96 %   05/29/24 1100 117/58 -- -- 76 -- 97 %   05/29/24 1030 107/65 -- -- 72 -- 96 %   05/29/24 1015 100/57 -- -- 72 -- 95 %   05/29/24 1000 107/56 -- --  82 -- 96 %   24 0945 110/70 -- -- 81 -- 95 %   24 0930 115/68 -- -- 83 -- 95 %   24 0915 123/79 -- -- 88 -- 93 %   24 0900 120/79 -- -- 77 -- 94 %     EFM: Baseline 120 bpm with minimal variabiity and accelerations present.  No decelerations or contractions.    A: IUP 25 6/7 weeks      Placental abruption       contractions resolved.      Previous  section.  P: D/C magnesium as second dose of betamethasone completed.      Continuous monitoring.    High complex follow up visit.

## 2024-05-30 NOTE — PROGRESS NOTES
1914 - report received from ISHAN Young  1930 - Assessment completed. Pt reports feeling occasional cramping. Denies any leaking of fluid. Scant amt of red vag bleeding noted.  0235 - dr Donovan notified of change in FHR baseline and mag level 6.9. Order received to decrease mag infusion to 1gm/hr  0700 - Report given to ISHAN Tesfaye

## 2024-05-30 NOTE — PROGRESS NOTES
"Antepartum Progress Note    Continues to experience some dark brown discharge.  Denies contractions.  She does report some left mid abdominal discomfort with fetal movement.  She denies leaking fluid otherwise.  She reports good fetal movement.  She is eating and voiding without difficulty.  She thought she had to have a bowel movement but nothing came out this morning.  She would like something for constipation.  She denies chest pain or shortness of breath.  She denies lower extremity swelling.    /73   Pulse 72   Temp 36.1 °C (97 °F) (Temporal)   Resp 16   Ht 1.6 m (5' 3\")   Wt 66.2 kg (146 lb)   SpO2 95%   BMI 25.86 kg/m²     General: No apparent distress  Abdomen: Gravid, nontender, discomfort of left mid abdomen corresponds with palpation of her round ligament  Extremities: No edema    FHT: Baseline of 110s, minimal variability present, accelerations present, decelerations absent  Tocometer: Quiescent    Recent Labs     24  1530 24  1157   WBC 10.2 16.4*   RBC 3.97* 4.00*   HEMOGLOBIN 13.0 13.1   HEMATOCRIT 37.9 38.2   MCV 95.5 95.5   MCH 32.7 32.8   RDW 46.2 45.3   PLATELETCT 307 331   MPV 9.4 9.2       Imaging:  US 24: VTX, TELMA 16.5cm, CL 2.4 with funneling, EFW 17% at 756g    Assessment:   IUP at 25 weeks 6 days  Placental abruption  Threatened  labor  History of prior  delivery x 3  Maternal BRCA2 carrier    Plan:  Status post BMTZ on  and   Magnesium discontinued as currently stable  Status post NICU consult on 2024  Continuous EFM  COD every 72 hours  Dispo continued inpatient monitoring for now.  Possible DC home after her steroid window is reached and her bleeding is no longer evident.  For RLTCS at 37 weeks with concurrent BSO.    Cruz Medrano M.D.        "

## 2024-05-31 VITALS
HEIGHT: 63 IN | WEIGHT: 146 LBS | SYSTOLIC BLOOD PRESSURE: 96 MMHG | HEART RATE: 62 BPM | RESPIRATION RATE: 17 BRPM | OXYGEN SATURATION: 95 % | DIASTOLIC BLOOD PRESSURE: 57 MMHG | BODY MASS INDEX: 25.87 KG/M2 | TEMPERATURE: 97.5 F

## 2024-05-31 LAB
ABO GROUP BLD: NORMAL
BLD GP AB SCN SERPL QL: NORMAL
RH BLD: NORMAL

## 2024-05-31 PROCEDURE — 700102 HCHG RX REV CODE 250 W/ 637 OVERRIDE(OP): Performed by: OBSTETRICS & GYNECOLOGY

## 2024-05-31 PROCEDURE — 770002 HCHG ROOM/CARE - OB PRIVATE (112)

## 2024-05-31 PROCEDURE — 86901 BLOOD TYPING SEROLOGIC RH(D): CPT

## 2024-05-31 PROCEDURE — 86900 BLOOD TYPING SEROLOGIC ABO: CPT

## 2024-05-31 PROCEDURE — 302790 HCHG STAT ANTEPARTUM CARE, DAILY

## 2024-05-31 PROCEDURE — 86850 RBC ANTIBODY SCREEN: CPT

## 2024-05-31 PROCEDURE — 99231 SBSQ HOSP IP/OBS SF/LOW 25: CPT | Performed by: OBSTETRICS & GYNECOLOGY

## 2024-05-31 PROCEDURE — A9270 NON-COVERED ITEM OR SERVICE: HCPCS | Performed by: OBSTETRICS & GYNECOLOGY

## 2024-05-31 PROCEDURE — 36415 COLL VENOUS BLD VENIPUNCTURE: CPT

## 2024-05-31 RX ADMIN — DOCUSATE SODIUM 100 MG: 100 CAPSULE, LIQUID FILLED ORAL at 09:37

## 2024-05-31 RX ADMIN — POLYETHYLENE GLYCOL 3350 1 PACKET: 17 POWDER, FOR SOLUTION ORAL at 09:37

## 2024-05-31 RX ADMIN — DOCUSATE SODIUM 100 MG: 100 CAPSULE, LIQUID FILLED ORAL at 22:00

## 2024-05-31 RX ADMIN — PRENATAL WITH FERROUS FUM AND FOLIC ACID 1 TABLET: 3080; 920; 120; 400; 22; 1.84; 3; 20; 10; 1; 12; 200; 27; 25; 2 TABLET ORAL at 09:37

## 2024-05-31 ASSESSMENT — PAIN DESCRIPTION - PAIN TYPE: TYPE: ACUTE PAIN

## 2024-05-31 NOTE — CARE PLAN
Problem: Psychosocial - L&D  Goal: Patient's level of anxiety will decrease  Outcome: Progressing  Note: Pt encouraged to voice concerns and ask questions as they arise.     Problem: Pain  Goal: Patient's pain will be alleviated or reduced to the patient’s comfort goal  Outcome: Progressing  Note: Pt denies feeling any abd pain or ctx. To notify RN if they develop   The patient is Stable - Low risk of patient condition declining or worsening    Shift Goals  Clinical Goals: Monitor for vag bleed and ctx, prolong pregnancy  Patient Goals: stay pregnant  Family Goals: support    Progress made toward(s) clinical / shift goals:  Quarter size amt of brown old blood noted on pad when pt got out of bed. Irregular ctx noted on monitor but pt denies feeling ctx. Will notify RN if she begins to feel ctx or abd pain    Patient is not progressing towards the following goals:N/A

## 2024-05-31 NOTE — PROGRESS NOTES
0700 Report received from ISHAN Ramirez. POC discussed, questions answered.     0900 Dr. Jones given update. Orders received for 1hr q shift, and POC discussed of possible discharge tomorrow.    0935 Patient states +FM. Denies LOF, VB, or Ucs. POC discussed with patient, questions answered. Encouraged to call out with any additional questions. No needs at this time.    1900 Report given to ISHAN Briones. POC discussed, questions answered.

## 2024-05-31 NOTE — PROGRESS NOTES
1850 - Report received from ISHAN Tesfaye  2000 - Assessment completed. Pt up to void and quarter size amt of old brown blood noted on pad. Denies any abd pain or ctx. Reports good fm  0046 - Dr gonzales notified that pt is beginning to contract with irritability in between and that pt is sleeping through them  0710 - Report given to ISHAN Sofia

## 2024-06-01 PROCEDURE — A9270 NON-COVERED ITEM OR SERVICE: HCPCS | Performed by: OBSTETRICS & GYNECOLOGY

## 2024-06-01 PROCEDURE — 700102 HCHG RX REV CODE 250 W/ 637 OVERRIDE(OP): Performed by: OBSTETRICS & GYNECOLOGY

## 2024-06-01 PROCEDURE — 99231 SBSQ HOSP IP/OBS SF/LOW 25: CPT | Performed by: OBSTETRICS & GYNECOLOGY

## 2024-06-01 RX ADMIN — PRENATAL WITH FERROUS FUM AND FOLIC ACID 1 TABLET: 3080; 920; 120; 400; 22; 1.84; 3; 20; 10; 1; 12; 200; 27; 25; 2 TABLET ORAL at 07:48

## 2024-06-01 RX ADMIN — POLYETHYLENE GLYCOL 3350 1 PACKET: 17 POWDER, FOR SOLUTION ORAL at 07:48

## 2024-06-01 RX ADMIN — DOCUSATE SODIUM 100 MG: 100 CAPSULE, LIQUID FILLED ORAL at 07:48

## 2024-06-01 ASSESSMENT — PAIN DESCRIPTION - PAIN TYPE
TYPE: ACUTE PAIN
TYPE: ACUTE PAIN

## 2024-06-01 NOTE — PROGRESS NOTES
S: No complaints or bleeding.  O: Patient Vitals for the past 24 hrs:   BP Temp Temp src Pulse Resp SpO2   24 1600 110/74 36.2 °C (97.2 °F) Temporal 62 17 --   24 1200 103/64 36.1 °C (97 °F) Temporal (!) 56 16 --   24 0800 103/61 36.2 °C (97.2 °F) Temporal 60 17 95 %   24 0400 95/54 36.2 °C (97.2 °F) Temporal 65 18 --   24 0000 93/53 36.6 °C (97.9 °F) Temporal 75 16 --   24 2000 103/56 36.6 °C (97.8 °F) Temporal 79 16 --     EFM: Baseline 130 bpm.  Moderate variability and accelerations present.  No decelerations or contractions.    A: IUP 26 weeks      Placental abruption       contractions resolved      Previous  section  P: Observe for now       Possible discharge tomorrow    Low complex follow up visit

## 2024-06-01 NOTE — PROGRESS NOTES
0700- Report received from ISHAN Briones. Patient resting quietly in bed, denies needs or concerns. Denies uterine contractions or cramping, denies bright red vaginal bleeding, does reports some dark brown vaginal discharge when wiping. Denies LOF. Endorses positive fetal movement. Encouraged to call out with questions and concerns.     0830- Discharge orders received from Dr. Tripp.     0845- Discharge instructions reviewed with patient, verbalized understanding and agreement with discharge plan. Verbalized reasons to return to L&D for evaluation and will attend follow up appointments as scheduled.

## 2024-06-01 NOTE — PROGRESS NOTES
1900 - Report received, assumed care    2100 - Assessment completed. Patient denies CTX/LOF, denies recent/active VB. Patient reports only small older blood occasional. Patient affirms normal fetal movement.     0700 - Report given, care relinquished.

## 2024-06-01 NOTE — DISCHARGE SUMMARY
DATE OF ADMISSION:  2024   DATE OF DISCHARGE:  2024     ADMITTING DIAGNOSES:    1.  A 39-year-old female  6, para 3 at 25 and 4/7th weeks' gestational   age.  2.  Vaginal bleeding with shortened cervix.  3.  Placental lake, rule out abruption.  4.  Bicornuate uterus.  5.  History of  labor and delivery x2.  6.  Prior  section x3.  7.  BRCA gene carrier.     DISCHARGE DIAGNOSES:    1.  A 39-year-old female  6, para 3 at 25 and 4/7th weeks' gestational   age.  2.  Vaginal bleeding with shortened cervix.  3.  Placental lake, rule out abruption.  4.  Bicornuate uterus.  5.  History of  labor and delivery x2.  6.  Prior  section x3.  7.  BRCA gene carrier.  8.  Vaginal bleeding resolved and fetal status reassuring.     HOSPITAL COURSE:  The patient was admitted with the above-stated diagnoses.    While she was seen in the hospital, she received steroids for fetal lung   maturity.  She had a NICU consultation.  She did get magnesium sulfate for   tocolysis for 24 hours.  As she has been observed over the last 3 days, her   vaginal bleeding has resolved.  Fetal status has always been reassuring.  Dr. Jones has been following her.  He states that since her vaginal bleeding has   resolved, she is stable to be discharged home.  She has been instructed to   follow up with him in 1 week and she already has her followup appointment with   Dr. Montenegro, her primary obstetrician.  She knows that if she has any   reoccurrence of her vaginal bleeding, she will immediately return to the   hospital.        ______________________________  Corinne E. Capurro, MD [facility]    DD:  2024 08:23  DT:  2024 10:51    Job#:  [id number]

## 2024-06-01 NOTE — PROGRESS NOTES
S: No complaints.  O: Patient Vitals for the past 24 hrs:   BP Temp Temp src Pulse Resp   05/31/24 1900 96/57 36.4 °C (97.5 °F) Temporal -- --   05/31/24 1600 110/74 36.2 °C (97.2 °F) Temporal 62 17   05/31/24 1200 103/64 36.1 °C (97 °F) Temporal (!) 56 16     EFM: Baseline 140 bpm. Moderate variability and accelerations present.  No contractions or decelerations.    A: IUP 26 1/7      Abruptio placenta not stable  P: Agree with discharge today      Follow up in 1-2 weeks for ultrasound with us.    Low complex follow up hospital visit

## 2024-06-01 NOTE — CARE PLAN
The patient is Watcher - Medium risk of patient condition declining or worsening    Shift Goals  Clinical Goals: Remain free of VB, Maintain reassuring FHT  Patient Goals: Healthy baby, healthy mom  Family Goals: Support    Progress made toward(s) clinical / shift goals:    Problem: Psychosocial - L&D  Goal: Patient's level of anxiety will decrease  Outcome: Progressing  Note: Assess and identify positive behaviors, emotional response and participation during hospitalization.      Problem: Risk for Injury  Goal: Patient and fetus will be free of preventable injury/complications  Outcome: Progressing  Note: Monitor uterine activity, labor s/s and fetal well-being        Patient is not progressing towards the following goals: N/A

## 2024-06-01 NOTE — PROGRESS NOTES
PROGRESS NOTE    Date: 2024  Hospital day: #3  Gestational Age: 26+0 weeks.  Primary OB/GYN: Sandrine Montenegro MD  [facility] Consultant: Mo Jones MD    Subjective: The patient reports that overall she is doing well.  She endorses positive fetal movement.  She denies vaginal bleeding, loss of fluid or contractions.  She has no other concerns at this time.    Objective:   Physical Exam:  Vital signs:   Vitals:    24 1600   BP: 110/74   Pulse: 62   Resp: 17   Temp: 36.2 °C (97.2 °F)   SpO2:    General: Alert, conversational, pleasant, no acute distress  Pulmonary: No respiratory distress, lungs clear to auscultation bilaterally   Cardiovascular: Regular rate and rhythm   Abdomen: Soft, gravid, non-tender, non-distended  Genitourinary: Deferred  Extremities: Moves all, no edema    FHT: Baseline 140s, moderate variability, positive colorations, no decelerations, tocometer quiet.    Laboratory studies:    Latest Reference Range & Units 24 15:30 24 11:57   WBC 4.8 - 10.8 K/uL 10.2 16.4 (H)   RBC 4.20 - 5.40 M/uL 3.97 (L) 4.00 (L)   Hemoglobin 12.0 - 16.0 g/dL 13.0 13.1   Hematocrit 37.0 - 47.0 % 37.9 38.2   MCV 81.4 - 97.8 fL 95.5 95.5   MCH 27.0 - 33.0 pg 32.7 32.8   MCHC 32.2 - 35.5 g/dL 34.3 34.3   RDW 35.9 - 50.0 fL 46.2 45.3   Platelet Count 164 - 446 K/uL 307 331   MPV 9.0 - 12.9 fL 9.4 9.2     Imaging:  US 24: VTX, TELMA 16.5cm, CL 2.4 with funneling, EFW 17% at 756g     ASSESSMENT & PLAN  Raisa Nichols is a 39 y.o.  at 26+0 weeks (EDC 2024)    Assessment:  1.   intrauterine pregnancy at 26 posterior weeks.  2.  Placental abruption.  3.  Threatened  labor.  4.  History of  section x 3  5.  Desires permanent sterilization.  6.  Maternal BRCA2 carrier    Plan:  1.  Status post betamethasone on  and .  2.  Magnesium discontinued 90 stable.  3.  Status post NICU consultation on .  4.  1 hour NST every shift.  5.  COD every 72  hours.  6.  Desires BSO with her repeat  section due to BRCA2 mutation status.    Plan of care discussed with the patient and she is in agreement with this plan. Plan of care discussed with RN taking carer of patient. All questions and concerns were answered.     Martha Guerrero M.D.     & PLAN  Raisa Nichols is a 39 y.o.  at 26+0 weeks (EDC 2024)     Assessment:  1.   intrauterine pregnancy at 26 posterior weeks.  2.  Placental abruption.  3.  Threatened  labor.  4.  History of  section x 3  5.  Desires permanent sterilization.  6.  Maternal BRCA2 carrier     Plan:  1.  Status post betamethasone on  and .  2.  Magnesium discontinued 90 stable.  3.  Status post NICU consultation on .  4.  1 hour NST every shift.  5.  COD every 72 hours.  6.  Desires BSO with her repeat  section due to BRCA2 mutation status.     Plan of care discussed with the patient and she is in agreement with this plan. Plan of care discussed with RN taking carer of patient. All questions and concerns were answered.      Martha Guerrero M.D.

## 2024-06-06 ENCOUNTER — ANCILLARY PROCEDURE (OUTPATIENT)
Dept: MATERNAL FETAL MEDICINE | Facility: MEDICAL CENTER | Age: 39
End: 2024-06-06
Payer: COMMERCIAL

## 2024-06-06 VITALS
SYSTOLIC BLOOD PRESSURE: 110 MMHG | HEART RATE: 86 BPM | BODY MASS INDEX: 25.56 KG/M2 | DIASTOLIC BLOOD PRESSURE: 73 MMHG | WEIGHT: 144.3 LBS

## 2024-06-06 DIAGNOSIS — O34.02 UTERINE CONGENITAL ANOMALY IN PREGNANCY, SECOND TRIMESTER: ICD-10-CM

## 2024-06-06 DIAGNOSIS — O26.872 SHORT CERVIX IN SECOND TRIMESTER, ANTEPARTUM: ICD-10-CM

## 2024-06-06 DIAGNOSIS — O09.522 ADVANCED MATERNAL AGE IN MULTIGRAVIDA, SECOND TRIMESTER: ICD-10-CM

## 2024-06-06 DIAGNOSIS — O35.BXX0 ANOMALY OF HEART OF FETUS AFFECTING PREGNANCY, ANTEPARTUM, SINGLE OR UNSPECIFIED FETUS: ICD-10-CM

## 2024-06-06 DIAGNOSIS — O09.292 PREGNANCY WITH POOR REPRODUCTIVE HISTORY IN SECOND TRIMESTER: ICD-10-CM

## 2024-06-06 DIAGNOSIS — Z3A.27 27 WEEKS GESTATION OF PREGNANCY: ICD-10-CM

## 2024-06-06 DIAGNOSIS — O46.92 VAGINAL BLEEDING IN PREGNANCY, SECOND TRIMESTER: ICD-10-CM

## 2024-06-06 ASSESSMENT — FIBROSIS 4 INDEX: FIB4 SCORE: 0.44

## 2024-06-27 ENCOUNTER — ANCILLARY PROCEDURE (OUTPATIENT)
Dept: MATERNAL FETAL MEDICINE | Facility: MEDICAL CENTER | Age: 39
End: 2024-06-27
Payer: COMMERCIAL

## 2024-06-27 VITALS
DIASTOLIC BLOOD PRESSURE: 69 MMHG | HEART RATE: 78 BPM | WEIGHT: 150.9 LBS | SYSTOLIC BLOOD PRESSURE: 114 MMHG | BODY MASS INDEX: 26.73 KG/M2

## 2024-06-27 DIAGNOSIS — O26.872 SHORT CERVIX IN SECOND TRIMESTER, ANTEPARTUM: ICD-10-CM

## 2024-06-27 DIAGNOSIS — Z3A.29 29 WEEKS GESTATION OF PREGNANCY: ICD-10-CM

## 2024-06-27 DIAGNOSIS — O09.522 ADVANCED MATERNAL AGE IN MULTIGRAVIDA, SECOND TRIMESTER: ICD-10-CM

## 2024-06-27 DIAGNOSIS — O34.02 UTERINE CONGENITAL ANOMALY IN PREGNANCY, SECOND TRIMESTER: ICD-10-CM

## 2024-06-27 DIAGNOSIS — O35.BXX0 ANOMALY OF HEART OF FETUS AFFECTING PREGNANCY, ANTEPARTUM, SINGLE OR UNSPECIFIED FETUS: ICD-10-CM

## 2024-06-27 DIAGNOSIS — O46.92 VAGINAL BLEEDING IN PREGNANCY, SECOND TRIMESTER: ICD-10-CM

## 2024-06-27 ASSESSMENT — FIBROSIS 4 INDEX: FIB4 SCORE: 0.44

## 2024-08-01 ENCOUNTER — APPOINTMENT (OUTPATIENT)
Dept: MATERNAL FETAL MEDICINE | Facility: MEDICAL CENTER | Age: 39
End: 2024-08-01
Payer: MEDICAID

## 2024-08-01 VITALS
BODY MASS INDEX: 27.56 KG/M2 | WEIGHT: 155.6 LBS | HEART RATE: 84 BPM | DIASTOLIC BLOOD PRESSURE: 68 MMHG | SYSTOLIC BLOOD PRESSURE: 110 MMHG

## 2024-08-01 DIAGNOSIS — Z3A.34 34 WEEKS GESTATION OF PREGNANCY: ICD-10-CM

## 2024-08-01 DIAGNOSIS — O46.92 VAGINAL BLEEDING IN PREGNANCY, SECOND TRIMESTER: ICD-10-CM

## 2024-08-01 DIAGNOSIS — O34.02 UTERINE CONGENITAL ANOMALY IN PREGNANCY, SECOND TRIMESTER: ICD-10-CM

## 2024-08-01 DIAGNOSIS — O09.522 ADVANCED MATERNAL AGE IN MULTIGRAVIDA, SECOND TRIMESTER: ICD-10-CM

## 2024-08-01 DIAGNOSIS — O26.872 SHORT CERVIX IN SECOND TRIMESTER, ANTEPARTUM: ICD-10-CM

## 2024-08-01 PROCEDURE — 76816 OB US FOLLOW-UP PER FETUS: CPT | Performed by: OBSTETRICS & GYNECOLOGY

## 2024-08-01 ASSESSMENT — FIBROSIS 4 INDEX: FIB4 SCORE: 0.44

## 2024-08-02 ENCOUNTER — APPOINTMENT (OUTPATIENT)
Dept: ADMISSIONS | Facility: MEDICAL CENTER | Age: 39
End: 2024-08-02
Attending: OBSTETRICS & GYNECOLOGY
Payer: MEDICAID

## 2024-08-09 ENCOUNTER — PRE-ADMISSION TESTING (OUTPATIENT)
Dept: ADMISSIONS | Facility: MEDICAL CENTER | Age: 39
End: 2024-08-09
Attending: OBSTETRICS & GYNECOLOGY
Payer: MEDICAID

## 2024-09-01 ENCOUNTER — ANESTHESIA EVENT (OUTPATIENT)
Dept: OBGYN | Facility: MEDICAL CENTER | Age: 39
End: 2024-09-01
Payer: MEDICAID

## 2024-09-01 ENCOUNTER — HOSPITAL ENCOUNTER (INPATIENT)
Facility: MEDICAL CENTER | Age: 39
LOS: 2 days | End: 2024-09-03
Attending: OBSTETRICS & GYNECOLOGY | Admitting: OBSTETRICS & GYNECOLOGY
Payer: MEDICAID

## 2024-09-01 ENCOUNTER — ANESTHESIA (OUTPATIENT)
Dept: OBGYN | Facility: MEDICAL CENTER | Age: 39
End: 2024-09-01
Payer: MEDICAID

## 2024-09-01 DIAGNOSIS — G89.18 POSTOPERATIVE PAIN: ICD-10-CM

## 2024-09-01 LAB
ABO GROUP BLD: NORMAL
BASOPHILS # BLD AUTO: 0.2 % (ref 0–1.8)
BASOPHILS # BLD: 0.02 K/UL (ref 0–0.12)
BLD GP AB SCN SERPL QL: NORMAL
EOSINOPHIL # BLD AUTO: 0.02 K/UL (ref 0–0.51)
EOSINOPHIL NFR BLD: 0.2 % (ref 0–6.9)
ERYTHROCYTE [DISTWIDTH] IN BLOOD BY AUTOMATED COUNT: 48.7 FL (ref 35.9–50)
HCT VFR BLD AUTO: 38.8 % (ref 37–47)
HGB BLD-MCNC: 13.3 G/DL (ref 12–16)
HOLDING TUBE BB 8507: NORMAL
IMM GRANULOCYTES # BLD AUTO: 0.03 K/UL (ref 0–0.11)
IMM GRANULOCYTES NFR BLD AUTO: 0.3 % (ref 0–0.9)
LYMPHOCYTES # BLD AUTO: 1.8 K/UL (ref 1–4.8)
LYMPHOCYTES NFR BLD: 19 % (ref 22–41)
MCH RBC QN AUTO: 32.4 PG (ref 27–33)
MCHC RBC AUTO-ENTMCNC: 34.3 G/DL (ref 32.2–35.5)
MCV RBC AUTO: 94.4 FL (ref 81.4–97.8)
MONOCYTES # BLD AUTO: 0.61 K/UL (ref 0–0.85)
MONOCYTES NFR BLD AUTO: 6.4 % (ref 0–13.4)
NEUTROPHILS # BLD AUTO: 6.99 K/UL (ref 1.82–7.42)
NEUTROPHILS NFR BLD: 73.9 % (ref 44–72)
NRBC # BLD AUTO: 0 K/UL
NRBC BLD-RTO: 0 /100 WBC (ref 0–0.2)
PLATELET # BLD AUTO: 289 K/UL (ref 164–446)
PMV BLD AUTO: 9.7 FL (ref 9–12.9)
RBC # BLD AUTO: 4.11 M/UL (ref 4.2–5.4)
RH BLD: NORMAL
T PALLIDUM AB SER QL IA: NORMAL
WBC # BLD AUTO: 9.5 K/UL (ref 4.8–10.8)

## 2024-09-01 PROCEDURE — 160002 HCHG RECOVERY MINUTES (STAT): Performed by: OBSTETRICS & GYNECOLOGY

## 2024-09-01 PROCEDURE — 86900 BLOOD TYPING SEROLOGIC ABO: CPT

## 2024-09-01 PROCEDURE — 10907ZC DRAINAGE OF AMNIOTIC FLUID, THERAPEUTIC FROM PRODUCTS OF CONCEPTION, VIA NATURAL OR ARTIFICIAL OPENING: ICD-10-PCS | Performed by: OBSTETRICS & GYNECOLOGY

## 2024-09-01 PROCEDURE — C1755 CATHETER, INTRASPINAL: HCPCS | Performed by: OBSTETRICS & GYNECOLOGY

## 2024-09-01 PROCEDURE — 0UT70ZZ RESECTION OF BILATERAL FALLOPIAN TUBES, OPEN APPROACH: ICD-10-PCS | Performed by: OBSTETRICS & GYNECOLOGY

## 2024-09-01 PROCEDURE — 700111 HCHG RX REV CODE 636 W/ 250 OVERRIDE (IP): Mod: JZ | Performed by: STUDENT IN AN ORGANIZED HEALTH CARE EDUCATION/TRAINING PROGRAM

## 2024-09-01 PROCEDURE — 160029 HCHG SURGERY MINUTES - 1ST 30 MINS LEVEL 4: Performed by: OBSTETRICS & GYNECOLOGY

## 2024-09-01 PROCEDURE — 160048 HCHG OR STATISTICAL LEVEL 1-5: Performed by: OBSTETRICS & GYNECOLOGY

## 2024-09-01 PROCEDURE — 0UBM0ZZ EXCISION OF VULVA, OPEN APPROACH: ICD-10-PCS | Performed by: OBSTETRICS & GYNECOLOGY

## 2024-09-01 PROCEDURE — 770002 HCHG ROOM/CARE - OB PRIVATE (112)

## 2024-09-01 PROCEDURE — 85025 COMPLETE CBC W/AUTO DIFF WBC: CPT

## 2024-09-01 PROCEDURE — 160041 HCHG SURGERY MINUTES - EA ADDL 1 MIN LEVEL 4: Performed by: OBSTETRICS & GYNECOLOGY

## 2024-09-01 PROCEDURE — 88305 TISSUE EXAM BY PATHOLOGIST: CPT | Mod: 59

## 2024-09-01 PROCEDURE — 0UT20ZZ RESECTION OF BILATERAL OVARIES, OPEN APPROACH: ICD-10-PCS | Performed by: OBSTETRICS & GYNECOLOGY

## 2024-09-01 PROCEDURE — 36415 COLL VENOUS BLD VENIPUNCTURE: CPT

## 2024-09-01 PROCEDURE — 160035 HCHG PACU - 1ST 60 MINS PHASE I: Performed by: OBSTETRICS & GYNECOLOGY

## 2024-09-01 PROCEDURE — 160009 HCHG ANES TIME/MIN: Performed by: OBSTETRICS & GYNECOLOGY

## 2024-09-01 PROCEDURE — 86850 RBC ANTIBODY SCREEN: CPT

## 2024-09-01 PROCEDURE — A9270 NON-COVERED ITEM OR SERVICE: HCPCS | Performed by: STUDENT IN AN ORGANIZED HEALTH CARE EDUCATION/TRAINING PROGRAM

## 2024-09-01 PROCEDURE — 700111 HCHG RX REV CODE 636 W/ 250 OVERRIDE (IP): Performed by: OBSTETRICS & GYNECOLOGY

## 2024-09-01 PROCEDURE — 86780 TREPONEMA PALLIDUM: CPT

## 2024-09-01 PROCEDURE — 700105 HCHG RX REV CODE 258: Performed by: STUDENT IN AN ORGANIZED HEALTH CARE EDUCATION/TRAINING PROGRAM

## 2024-09-01 PROCEDURE — 700102 HCHG RX REV CODE 250 W/ 637 OVERRIDE(OP): Performed by: STUDENT IN AN ORGANIZED HEALTH CARE EDUCATION/TRAINING PROGRAM

## 2024-09-01 PROCEDURE — 86901 BLOOD TYPING SEROLOGIC RH(D): CPT

## 2024-09-01 RX ORDER — KETOROLAC TROMETHAMINE 15 MG/ML
15 INJECTION, SOLUTION INTRAMUSCULAR; INTRAVENOUS EVERY 6 HOURS
Status: DISCONTINUED | OUTPATIENT
Start: 2024-09-02 | End: 2024-09-01

## 2024-09-01 RX ORDER — SODIUM CHLORIDE, SODIUM LACTATE, POTASSIUM CHLORIDE, CALCIUM CHLORIDE 600; 310; 30; 20 MG/100ML; MG/100ML; MG/100ML; MG/100ML
INJECTION, SOLUTION INTRAVENOUS PRN
Status: DISCONTINUED | OUTPATIENT
Start: 2024-09-01 | End: 2024-09-03 | Stop reason: HOSPADM

## 2024-09-01 RX ORDER — ONDANSETRON 2 MG/ML
4 INJECTION INTRAMUSCULAR; INTRAVENOUS EVERY 6 HOURS PRN
Status: DISCONTINUED | OUTPATIENT
Start: 2024-09-02 | End: 2024-09-02

## 2024-09-01 RX ORDER — OXYCODONE HYDROCHLORIDE 10 MG/1
10 TABLET ORAL EVERY 4 HOURS PRN
Status: DISCONTINUED | OUTPATIENT
Start: 2024-09-01 | End: 2024-09-01

## 2024-09-01 RX ORDER — OXYCODONE HCL 5 MG/5 ML
10 SOLUTION, ORAL ORAL
Status: DISCONTINUED | OUTPATIENT
Start: 2024-09-01 | End: 2024-09-01 | Stop reason: HOSPADM

## 2024-09-01 RX ORDER — ONDANSETRON 2 MG/ML
4 INJECTION INTRAMUSCULAR; INTRAVENOUS
Status: DISCONTINUED | OUTPATIENT
Start: 2024-09-01 | End: 2024-09-01 | Stop reason: HOSPADM

## 2024-09-01 RX ORDER — ACETAMINOPHEN 500 MG
1000 TABLET ORAL EVERY 6 HOURS
Status: DISCONTINUED | OUTPATIENT
Start: 2024-09-01 | End: 2024-09-01

## 2024-09-01 RX ORDER — HYDROMORPHONE HYDROCHLORIDE 1 MG/ML
0.4 INJECTION, SOLUTION INTRAMUSCULAR; INTRAVENOUS; SUBCUTANEOUS
Status: DISCONTINUED | OUTPATIENT
Start: 2024-09-01 | End: 2024-09-01 | Stop reason: HOSPADM

## 2024-09-01 RX ORDER — IBUPROFEN 800 MG/1
800 TABLET, FILM COATED ORAL EVERY 8 HOURS
Status: DISCONTINUED | OUTPATIENT
Start: 2024-09-03 | End: 2024-09-03 | Stop reason: HOSPADM

## 2024-09-01 RX ORDER — HYDROMORPHONE HYDROCHLORIDE 1 MG/ML
0.2 INJECTION, SOLUTION INTRAMUSCULAR; INTRAVENOUS; SUBCUTANEOUS
Status: DISCONTINUED | OUTPATIENT
Start: 2024-09-01 | End: 2024-09-01

## 2024-09-01 RX ORDER — BUPIVACAINE HYDROCHLORIDE 7.5 MG/ML
INJECTION, SOLUTION INTRASPINAL
Status: COMPLETED | OUTPATIENT
Start: 2024-09-01 | End: 2024-09-01

## 2024-09-01 RX ORDER — DIPHENHYDRAMINE HYDROCHLORIDE 50 MG/ML
12.5 INJECTION INTRAMUSCULAR; INTRAVENOUS EVERY 6 HOURS PRN
Status: DISCONTINUED | OUTPATIENT
Start: 2024-09-01 | End: 2024-09-01

## 2024-09-01 RX ORDER — MEPERIDINE HYDROCHLORIDE 25 MG/ML
6.25 INJECTION INTRAMUSCULAR; INTRAVENOUS; SUBCUTANEOUS
Status: DISCONTINUED | OUTPATIENT
Start: 2024-09-01 | End: 2024-09-01 | Stop reason: HOSPADM

## 2024-09-01 RX ORDER — SODIUM CHLORIDE, SODIUM LACTATE, POTASSIUM CHLORIDE, CALCIUM CHLORIDE 600; 310; 30; 20 MG/100ML; MG/100ML; MG/100ML; MG/100ML
INJECTION, SOLUTION INTRAVENOUS CONTINUOUS
Status: DISCONTINUED | OUTPATIENT
Start: 2024-09-01 | End: 2024-09-02

## 2024-09-01 RX ORDER — SODIUM CHLORIDE, SODIUM LACTATE, POTASSIUM CHLORIDE, CALCIUM CHLORIDE 600; 310; 30; 20 MG/100ML; MG/100ML; MG/100ML; MG/100ML
INJECTION, SOLUTION INTRAVENOUS ONCE
Status: COMPLETED | OUTPATIENT
Start: 2024-09-01 | End: 2024-09-02

## 2024-09-01 RX ORDER — DEXAMETHASONE SODIUM PHOSPHATE 4 MG/ML
INJECTION, SOLUTION INTRA-ARTICULAR; INTRALESIONAL; INTRAMUSCULAR; INTRAVENOUS; SOFT TISSUE PRN
Status: DISCONTINUED | OUTPATIENT
Start: 2024-09-01 | End: 2024-09-01 | Stop reason: SURG

## 2024-09-01 RX ORDER — EPHEDRINE SULFATE 50 MG/ML
10 INJECTION, SOLUTION INTRAVENOUS
Status: DISCONTINUED | OUTPATIENT
Start: 2024-09-01 | End: 2024-09-01

## 2024-09-01 RX ORDER — LABETALOL HYDROCHLORIDE 5 MG/ML
5 INJECTION, SOLUTION INTRAVENOUS
Status: DISCONTINUED | OUTPATIENT
Start: 2024-09-01 | End: 2024-09-01 | Stop reason: HOSPADM

## 2024-09-01 RX ORDER — HYDROMORPHONE HYDROCHLORIDE 1 MG/ML
0.2 INJECTION, SOLUTION INTRAMUSCULAR; INTRAVENOUS; SUBCUTANEOUS
Status: DISCONTINUED | OUTPATIENT
Start: 2024-09-01 | End: 2024-09-01 | Stop reason: HOSPADM

## 2024-09-01 RX ORDER — EPHEDRINE SULFATE 50 MG/ML
5 INJECTION, SOLUTION INTRAVENOUS
Status: DISCONTINUED | OUTPATIENT
Start: 2024-09-01 | End: 2024-09-01 | Stop reason: HOSPADM

## 2024-09-01 RX ORDER — ACETAMINOPHEN 500 MG
1000 TABLET ORAL EVERY 6 HOURS PRN
Status: DISCONTINUED | OUTPATIENT
Start: 2024-09-06 | End: 2024-09-03 | Stop reason: HOSPADM

## 2024-09-01 RX ORDER — DIPHENHYDRAMINE HCL 25 MG
25 TABLET ORAL EVERY 6 HOURS PRN
Status: DISCONTINUED | OUTPATIENT
Start: 2024-09-02 | End: 2024-09-03 | Stop reason: HOSPADM

## 2024-09-01 RX ORDER — MORPHINE SULFATE 0.5 MG/ML
INJECTION, SOLUTION EPIDURAL; INTRATHECAL; INTRAVENOUS PRN
Status: DISCONTINUED | OUTPATIENT
Start: 2024-09-01 | End: 2024-09-01 | Stop reason: SURG

## 2024-09-01 RX ORDER — OXYCODONE HCL 5 MG/5 ML
5 SOLUTION, ORAL ORAL
Status: DISCONTINUED | OUTPATIENT
Start: 2024-09-01 | End: 2024-09-01 | Stop reason: HOSPADM

## 2024-09-01 RX ORDER — OXYCODONE HYDROCHLORIDE 5 MG/1
5 TABLET ORAL EVERY 4 HOURS PRN
Status: DISCONTINUED | OUTPATIENT
Start: 2024-09-02 | End: 2024-09-03 | Stop reason: HOSPADM

## 2024-09-01 RX ORDER — ONDANSETRON 2 MG/ML
INJECTION INTRAMUSCULAR; INTRAVENOUS PRN
Status: DISCONTINUED | OUTPATIENT
Start: 2024-09-01 | End: 2024-09-01 | Stop reason: SURG

## 2024-09-01 RX ORDER — DIPHENHYDRAMINE HYDROCHLORIDE 50 MG/ML
25 INJECTION INTRAMUSCULAR; INTRAVENOUS EVERY 6 HOURS PRN
Status: DISCONTINUED | OUTPATIENT
Start: 2024-09-01 | End: 2024-09-01

## 2024-09-01 RX ORDER — SODIUM CHLORIDE, SODIUM GLUCONATE, SODIUM ACETATE, POTASSIUM CHLORIDE AND MAGNESIUM CHLORIDE 526; 502; 368; 37; 30 MG/100ML; MG/100ML; MG/100ML; MG/100ML; MG/100ML
1500 INJECTION, SOLUTION INTRAVENOUS CONTINUOUS
Status: DISCONTINUED | OUTPATIENT
Start: 2024-09-01 | End: 2024-09-01

## 2024-09-01 RX ORDER — CEFAZOLIN SODIUM 1 G/3ML
2 INJECTION, POWDER, FOR SOLUTION INTRAMUSCULAR; INTRAVENOUS ONCE
Status: COMPLETED | OUTPATIENT
Start: 2024-09-01 | End: 2024-09-01

## 2024-09-01 RX ORDER — VITAMIN A ACETATE, BETA CAROTENE, ASCORBIC ACID, CHOLECALCIFEROL, .ALPHA.-TOCOPHEROL ACETATE, DL-, THIAMINE MONONITRATE, RIBOFLAVIN, NIACINAMIDE, PYRIDOXINE HYDROCHLORIDE, FOLIC ACID, CYANOCOBALAMIN, CALCIUM CARBONATE, FERROUS FUMARATE, ZINC OXIDE, CUPRIC OXIDE 3080; 12; 120; 400; 1; 1.84; 3; 20; 22; 920; 25; 200; 27; 10; 2 [IU]/1; UG/1; MG/1; [IU]/1; MG/1; MG/1; MG/1; MG/1; MG/1; [IU]/1; MG/1; MG/1; MG/1; MG/1; MG/1
1 TABLET, FILM COATED ORAL
Status: DISCONTINUED | OUTPATIENT
Start: 2024-09-02 | End: 2024-09-03 | Stop reason: HOSPADM

## 2024-09-01 RX ORDER — SIMETHICONE 125 MG
125 TABLET,CHEWABLE ORAL 4 TIMES DAILY PRN
Status: DISCONTINUED | OUTPATIENT
Start: 2024-09-01 | End: 2024-09-03 | Stop reason: HOSPADM

## 2024-09-01 RX ORDER — HALOPERIDOL 5 MG/ML
1 INJECTION INTRAMUSCULAR
Status: DISCONTINUED | OUTPATIENT
Start: 2024-09-01 | End: 2024-09-01 | Stop reason: HOSPADM

## 2024-09-01 RX ORDER — ALBUTEROL SULFATE 5 MG/ML
2.5 SOLUTION RESPIRATORY (INHALATION)
Status: DISCONTINUED | OUTPATIENT
Start: 2024-09-01 | End: 2024-09-01 | Stop reason: HOSPADM

## 2024-09-01 RX ORDER — HYDROMORPHONE HYDROCHLORIDE 1 MG/ML
0.4 INJECTION, SOLUTION INTRAMUSCULAR; INTRAVENOUS; SUBCUTANEOUS
Status: DISCONTINUED | OUTPATIENT
Start: 2024-09-01 | End: 2024-09-01

## 2024-09-01 RX ORDER — IBUPROFEN 800 MG/1
800 TABLET, FILM COATED ORAL EVERY 8 HOURS PRN
Status: DISCONTINUED | OUTPATIENT
Start: 2024-09-06 | End: 2024-09-03 | Stop reason: HOSPADM

## 2024-09-01 RX ORDER — DIPHENHYDRAMINE HYDROCHLORIDE 50 MG/ML
25 INJECTION INTRAMUSCULAR; INTRAVENOUS EVERY 6 HOURS PRN
Status: DISCONTINUED | OUTPATIENT
Start: 2024-09-02 | End: 2024-09-03 | Stop reason: HOSPADM

## 2024-09-01 RX ORDER — OXYTOCIN 10 [USP'U]/ML
10 INJECTION, SOLUTION INTRAMUSCULAR; INTRAVENOUS
Status: DISCONTINUED | OUTPATIENT
Start: 2024-09-01 | End: 2024-09-01

## 2024-09-01 RX ORDER — CITRIC ACID/SODIUM CITRATE 334-500MG
30 SOLUTION, ORAL ORAL ONCE
Status: COMPLETED | OUTPATIENT
Start: 2024-09-01 | End: 2024-09-01

## 2024-09-01 RX ORDER — HYDROMORPHONE HYDROCHLORIDE 1 MG/ML
0.1 INJECTION, SOLUTION INTRAMUSCULAR; INTRAVENOUS; SUBCUTANEOUS
Status: DISCONTINUED | OUTPATIENT
Start: 2024-09-01 | End: 2024-09-01 | Stop reason: HOSPADM

## 2024-09-01 RX ORDER — METOCLOPRAMIDE HYDROCHLORIDE 5 MG/ML
10 INJECTION INTRAMUSCULAR; INTRAVENOUS ONCE
Status: COMPLETED | OUTPATIENT
Start: 2024-09-01 | End: 2024-09-01

## 2024-09-01 RX ORDER — HYDRALAZINE HYDROCHLORIDE 20 MG/ML
5 INJECTION INTRAMUSCULAR; INTRAVENOUS
Status: DISCONTINUED | OUTPATIENT
Start: 2024-09-01 | End: 2024-09-01 | Stop reason: HOSPADM

## 2024-09-01 RX ORDER — SODIUM CHLORIDE, SODIUM GLUCONATE, SODIUM ACETATE, POTASSIUM CHLORIDE AND MAGNESIUM CHLORIDE 526; 502; 368; 37; 30 MG/100ML; MG/100ML; MG/100ML; MG/100ML; MG/100ML
INJECTION, SOLUTION INTRAVENOUS
Status: DISCONTINUED | OUTPATIENT
Start: 2024-09-01 | End: 2024-09-01 | Stop reason: SURG

## 2024-09-01 RX ORDER — OXYCODONE HYDROCHLORIDE 5 MG/1
5 TABLET ORAL EVERY 4 HOURS PRN
Status: DISCONTINUED | OUTPATIENT
Start: 2024-09-01 | End: 2024-09-01

## 2024-09-01 RX ORDER — SODIUM CHLORIDE, SODIUM LACTATE, POTASSIUM CHLORIDE, CALCIUM CHLORIDE 600; 310; 30; 20 MG/100ML; MG/100ML; MG/100ML; MG/100ML
INJECTION, SOLUTION INTRAVENOUS CONTINUOUS
Status: DISCONTINUED | OUTPATIENT
Start: 2024-09-01 | End: 2024-09-01

## 2024-09-01 RX ORDER — KETOROLAC TROMETHAMINE 15 MG/ML
INJECTION, SOLUTION INTRAMUSCULAR; INTRAVENOUS PRN
Status: DISCONTINUED | OUTPATIENT
Start: 2024-09-01 | End: 2024-09-01 | Stop reason: SURG

## 2024-09-01 RX ORDER — OXYCODONE HYDROCHLORIDE 10 MG/1
10 TABLET ORAL EVERY 4 HOURS PRN
Status: DISCONTINUED | OUTPATIENT
Start: 2024-09-02 | End: 2024-09-03 | Stop reason: HOSPADM

## 2024-09-01 RX ORDER — OXYTOCIN 10 [USP'U]/ML
INJECTION, SOLUTION INTRAMUSCULAR; INTRAVENOUS PRN
Status: DISCONTINUED | OUTPATIENT
Start: 2024-09-01 | End: 2024-09-01 | Stop reason: SURG

## 2024-09-01 RX ORDER — ONDANSETRON 4 MG/1
4 TABLET, ORALLY DISINTEGRATING ORAL EVERY 6 HOURS PRN
Status: DISCONTINUED | OUTPATIENT
Start: 2024-09-02 | End: 2024-09-02

## 2024-09-01 RX ORDER — METOPROLOL TARTRATE 1 MG/ML
1 INJECTION, SOLUTION INTRAVENOUS
Status: DISCONTINUED | OUTPATIENT
Start: 2024-09-01 | End: 2024-09-01 | Stop reason: HOSPADM

## 2024-09-01 RX ORDER — ONDANSETRON 2 MG/ML
4 INJECTION INTRAMUSCULAR; INTRAVENOUS EVERY 6 HOURS PRN
Status: DISCONTINUED | OUTPATIENT
Start: 2024-09-01 | End: 2024-09-01

## 2024-09-01 RX ORDER — DOCUSATE SODIUM 100 MG/1
100 CAPSULE, LIQUID FILLED ORAL 2 TIMES DAILY
Status: DISCONTINUED | OUTPATIENT
Start: 2024-09-01 | End: 2024-09-03 | Stop reason: HOSPADM

## 2024-09-01 RX ORDER — ACETAMINOPHEN 500 MG
1000 TABLET ORAL EVERY 6 HOURS
Status: DISCONTINUED | OUTPATIENT
Start: 2024-09-02 | End: 2024-09-03 | Stop reason: HOSPADM

## 2024-09-01 RX ORDER — DIPHENHYDRAMINE HYDROCHLORIDE 50 MG/ML
12.5 INJECTION INTRAMUSCULAR; INTRAVENOUS
Status: DISCONTINUED | OUTPATIENT
Start: 2024-09-01 | End: 2024-09-01 | Stop reason: HOSPADM

## 2024-09-01 RX ADMIN — FAMOTIDINE 20 MG: 10 INJECTION, SOLUTION INTRAVENOUS at 19:03

## 2024-09-01 RX ADMIN — FENTANYL CITRATE 15 MCG: 50 INJECTION, SOLUTION INTRAMUSCULAR; INTRAVENOUS at 19:27

## 2024-09-01 RX ADMIN — BUPIVACAINE HYDROCHLORIDE IN DEXTROSE 1.4 ML: 7.5 INJECTION, SOLUTION SUBARACHNOID at 19:27

## 2024-09-01 RX ADMIN — DEXAMETHASONE SODIUM PHOSPHATE 8 MG: 4 INJECTION INTRA-ARTICULAR; INTRALESIONAL; INTRAMUSCULAR; INTRAVENOUS; SOFT TISSUE at 19:30

## 2024-09-01 RX ADMIN — ONDANSETRON 4 MG: 2 INJECTION INTRAMUSCULAR; INTRAVENOUS at 19:50

## 2024-09-01 RX ADMIN — CEFAZOLIN 2 G: 1 INJECTION, POWDER, FOR SOLUTION INTRAMUSCULAR; INTRAVENOUS at 19:30

## 2024-09-01 RX ADMIN — MORPHINE SULFATE 100 MCG: 0.5 INJECTION, SOLUTION EPIDURAL; INTRATHECAL; INTRAVENOUS at 19:27

## 2024-09-01 RX ADMIN — METOCLOPRAMIDE 10 MG: 5 INJECTION, SOLUTION INTRAMUSCULAR; INTRAVENOUS at 19:04

## 2024-09-01 RX ADMIN — SODIUM CHLORIDE, SODIUM GLUCONATE, SODIUM ACETATE, POTASSIUM CHLORIDE AND MAGNESIUM CHLORIDE: 526; 502; 368; 37; 30 INJECTION, SOLUTION INTRAVENOUS at 19:39

## 2024-09-01 RX ADMIN — SODIUM CHLORIDE, SODIUM GLUCONATE, SODIUM ACETATE, POTASSIUM CHLORIDE AND MAGNESIUM CHLORIDE: 526; 502; 368; 37; 30 INJECTION, SOLUTION INTRAVENOUS at 18:33

## 2024-09-01 RX ADMIN — OXYTOCIN 10 UNITS: 10 INJECTION, SOLUTION INTRAMUSCULAR; INTRAVENOUS at 20:19

## 2024-09-01 RX ADMIN — OXYTOCIN 1000 ML: 10 INJECTION, SOLUTION INTRAMUSCULAR; INTRAVENOUS at 19:49

## 2024-09-01 RX ADMIN — PHENYLEPHRINE HYDROCHLORIDE 0.5 MCG/KG/MIN: 10 INJECTION INTRAVENOUS at 19:28

## 2024-09-01 RX ADMIN — KETOROLAC TROMETHAMINE 15 MG: 15 INJECTION, SOLUTION INTRAMUSCULAR; INTRAVENOUS at 20:36

## 2024-09-01 RX ADMIN — SODIUM CITRATE AND CITRIC ACID MONOHYDRATE 30 ML: 334; 500 SOLUTION ORAL at 19:03

## 2024-09-01 ASSESSMENT — LIFESTYLE VARIABLES
HAVE PEOPLE ANNOYED YOU BY CRITICIZING YOUR DRINKING: NO
EVER_SMOKED: NEVER
TOTAL SCORE: 0
AVERAGE NUMBER OF DAYS PER WEEK YOU HAVE A DRINK CONTAINING ALCOHOL: 0
HAVE YOU EVER FELT YOU SHOULD CUT DOWN ON YOUR DRINKING: NO
HOW MANY TIMES IN THE PAST YEAR HAVE YOU HAD 5 OR MORE DRINKS IN A DAY: 0
ON A TYPICAL DAY WHEN YOU DRINK ALCOHOL HOW MANY DRINKS DO YOU HAVE: 0
TOTAL SCORE: 0
EVER HAD A DRINK FIRST THING IN THE MORNING TO STEADY YOUR NERVES TO GET RID OF A HANGOVER: NO
TOTAL SCORE: 0
ALCOHOL_USE: NO
EVER FELT BAD OR GUILTY ABOUT YOUR DRINKING: NO
CONSUMPTION TOTAL: NEGATIVE

## 2024-09-01 ASSESSMENT — FIBROSIS 4 INDEX: FIB4 SCORE: 0.44

## 2024-09-01 NOTE — H&P
"Labor and Delivery History and Physical    CC: Presents for scheduled  section    Primary OB: Sandrine Montenegro MD    HPI: 38yo  at 39w2d, EDC 2024, presents to L&D for scheduled repeat . She is also a BRCA carrier and desires to have a BSO for ovarian cancer prevention. She is doing well today w/out complaints. Baby moving well. No VB, CTX, or LOF. Her pregnancy was complicated at 25 weeks with vaginal bleeding and contractions. She received steroids at that time for fetal lung maturity. Her symptoms resolved and she was discharged to home. She has not had any issues since.     All other systems were reviewed and were negative except as stated above.    PMH: BRCA carrier, vitamin D deficiency    PSH:  x 3    OB HX:   spontaneous    spontaneous    26 week  section for  labor with vaginal bleeding   34 week  section for  labor    at term.    Gyn Hx: Bicornuate uterus, BRCA2 Gene carrier, denies hx of STIs, no abnl paps     SH: no T/E/D,     FH: non-contributory to present condition    Meds: PNV, vitamin D, ASA 81mg daily    Allergies: NKDA    /74   Pulse 62   Temp 36.7 °C (98 °F) (Temporal)   Resp 18   Ht 1.6 m (5' 3\")   Wt 72.6 kg (160 lb)   SpO2 97%   BMI 28.34 kg/m²     Physical Exam  Gen: NAD  Resp: normal effort, no distress  Abd: soft, gravid, non tender, no rebound or guarding  Ext: No S/S of DVT, nontender    FHT: category 1  M[place]ead: irregular contractions  SVE: deferred    Laboratory Evaluation  ABO: A+  GBS: neg  GTT: abnl 1hr w/ normal 3hr  Rubella: Immune  HIV: Neg  RPR: NR  HepBsAg: neg  Hep C: neg      Assessment:   38yo  at 39w2d  Prior  x 3  Desires sterilization - for BSO today  BRCA 2 carrier  AMA  Hx of  delivery x 2  Bicornuate uterus  Fetal VSD    Plan:  Admit to L&D  NPO  Fetal monitoring and toco  IV fluids  Anesthesia to see  Consent and " prep for  section with bilateral salpingo-oophorectomy  Baby needs ECHO postpartum      Discussed with the patient the risks of  delivery. The risks include pain, infection, bleeding, scarring, damage to other organs in the area of operation, anesthesia complications, and death. Specifically organs that can be damaged are bowel, bladder, ureters. I also discussed with the patient the risk of wound infection and wound breakdown. We discussed that these risks are greater in people that have diabetes or obesity. I also discussed the risk of emergency blood transfusion during procedure as well as emergency hysterectomy during procedure. Patient had the opportunity to ask questions regarding procedures. All questions answered to the patient's satisfaction. Pt agrees to proceed with surgery.      Sandrine Montenegro M.D.

## 2024-09-02 LAB
ERYTHROCYTE [DISTWIDTH] IN BLOOD BY AUTOMATED COUNT: 47.4 FL (ref 35.9–50)
HCT VFR BLD AUTO: 31.4 % (ref 37–47)
HGB BLD-MCNC: 10.8 G/DL (ref 12–16)
MCH RBC QN AUTO: 32.2 PG (ref 27–33)
MCHC RBC AUTO-ENTMCNC: 34.4 G/DL (ref 32.2–35.5)
MCV RBC AUTO: 93.7 FL (ref 81.4–97.8)
PLATELET # BLD AUTO: 234 K/UL (ref 164–446)
PMV BLD AUTO: 10.2 FL (ref 9–12.9)
RBC # BLD AUTO: 3.35 M/UL (ref 4.2–5.4)
WBC # BLD AUTO: 16.7 K/UL (ref 4.8–10.8)

## 2024-09-02 PROCEDURE — 700111 HCHG RX REV CODE 636 W/ 250 OVERRIDE (IP): Mod: JZ | Performed by: STUDENT IN AN ORGANIZED HEALTH CARE EDUCATION/TRAINING PROGRAM

## 2024-09-02 PROCEDURE — A9270 NON-COVERED ITEM OR SERVICE: HCPCS | Performed by: OBSTETRICS & GYNECOLOGY

## 2024-09-02 PROCEDURE — 36415 COLL VENOUS BLD VENIPUNCTURE: CPT

## 2024-09-02 PROCEDURE — 85027 COMPLETE CBC AUTOMATED: CPT

## 2024-09-02 PROCEDURE — 770002 HCHG ROOM/CARE - OB PRIVATE (112)

## 2024-09-02 PROCEDURE — 700105 HCHG RX REV CODE 258: Performed by: OBSTETRICS & GYNECOLOGY

## 2024-09-02 PROCEDURE — A9270 NON-COVERED ITEM OR SERVICE: HCPCS | Performed by: STUDENT IN AN ORGANIZED HEALTH CARE EDUCATION/TRAINING PROGRAM

## 2024-09-02 PROCEDURE — 700102 HCHG RX REV CODE 250 W/ 637 OVERRIDE(OP): Performed by: STUDENT IN AN ORGANIZED HEALTH CARE EDUCATION/TRAINING PROGRAM

## 2024-09-02 PROCEDURE — 700102 HCHG RX REV CODE 250 W/ 637 OVERRIDE(OP): Performed by: OBSTETRICS & GYNECOLOGY

## 2024-09-02 PROCEDURE — 700111 HCHG RX REV CODE 636 W/ 250 OVERRIDE (IP): Mod: JZ | Performed by: OBSTETRICS & GYNECOLOGY

## 2024-09-02 RX ORDER — KETOROLAC TROMETHAMINE 15 MG/ML
15 INJECTION, SOLUTION INTRAMUSCULAR; INTRAVENOUS EVERY 6 HOURS
Status: COMPLETED | OUTPATIENT
Start: 2024-09-02 | End: 2024-09-02

## 2024-09-02 RX ORDER — OXYCODONE HYDROCHLORIDE 5 MG/1
5 TABLET ORAL EVERY 4 HOURS PRN
Status: DISPENSED | OUTPATIENT
Start: 2024-09-02 | End: 2024-09-02

## 2024-09-02 RX ORDER — ACETAMINOPHEN 500 MG
1000 TABLET ORAL EVERY 6 HOURS
Status: DISPENSED | OUTPATIENT
Start: 2024-09-02 | End: 2024-09-03

## 2024-09-02 RX ORDER — ONDANSETRON 4 MG/1
4 TABLET, ORALLY DISINTEGRATING ORAL EVERY 6 HOURS PRN
Status: DISCONTINUED | OUTPATIENT
Start: 2024-09-02 | End: 2024-09-03 | Stop reason: HOSPADM

## 2024-09-02 RX ORDER — OXYCODONE HYDROCHLORIDE 10 MG/1
10 TABLET ORAL EVERY 4 HOURS PRN
Status: ACTIVE | OUTPATIENT
Start: 2024-09-02 | End: 2024-09-02

## 2024-09-02 RX ORDER — ONDANSETRON 2 MG/ML
4 INJECTION INTRAMUSCULAR; INTRAVENOUS EVERY 6 HOURS PRN
Status: DISCONTINUED | OUTPATIENT
Start: 2024-09-02 | End: 2024-09-03 | Stop reason: HOSPADM

## 2024-09-02 RX ORDER — HYDROMORPHONE HYDROCHLORIDE 1 MG/ML
0.4 INJECTION, SOLUTION INTRAMUSCULAR; INTRAVENOUS; SUBCUTANEOUS
Status: ACTIVE | OUTPATIENT
Start: 2024-09-02 | End: 2024-09-03

## 2024-09-02 RX ORDER — HYDROMORPHONE HYDROCHLORIDE 1 MG/ML
0.2 INJECTION, SOLUTION INTRAMUSCULAR; INTRAVENOUS; SUBCUTANEOUS
Status: ACTIVE | OUTPATIENT
Start: 2024-09-02 | End: 2024-09-03

## 2024-09-02 RX ADMIN — KETOROLAC TROMETHAMINE 15 MG: 15 INJECTION, SOLUTION INTRAMUSCULAR; INTRAVENOUS at 20:23

## 2024-09-02 RX ADMIN — ACETAMINOPHEN 1000 MG: 500 TABLET ORAL at 08:04

## 2024-09-02 RX ADMIN — ACETAMINOPHEN 1000 MG: 500 TABLET ORAL at 19:15

## 2024-09-02 RX ADMIN — SODIUM CHLORIDE, POTASSIUM CHLORIDE, SODIUM LACTATE AND CALCIUM CHLORIDE: 600; 310; 30; 20 INJECTION, SOLUTION INTRAVENOUS at 00:32

## 2024-09-02 RX ADMIN — KETOROLAC TROMETHAMINE 15 MG: 15 INJECTION, SOLUTION INTRAMUSCULAR; INTRAVENOUS at 14:00

## 2024-09-02 RX ADMIN — DOCUSATE SODIUM 100 MG: 100 CAPSULE, LIQUID FILLED ORAL at 17:38

## 2024-09-02 RX ADMIN — ACETAMINOPHEN 1000 MG: 500 TABLET ORAL at 14:00

## 2024-09-02 RX ADMIN — KETOROLAC TROMETHAMINE 15 MG: 15 INJECTION, SOLUTION INTRAMUSCULAR; INTRAVENOUS at 01:36

## 2024-09-02 RX ADMIN — ONDANSETRON 4 MG: 2 INJECTION INTRAMUSCULAR; INTRAVENOUS at 02:38

## 2024-09-02 RX ADMIN — PRENATAL WITH FERROUS FUM AND FOLIC ACID 1 TABLET: 3080; 920; 120; 400; 22; 1.84; 3; 20; 10; 1; 12; 200; 27; 25; 2 TABLET ORAL at 08:48

## 2024-09-02 RX ADMIN — KETOROLAC TROMETHAMINE 15 MG: 15 INJECTION, SOLUTION INTRAMUSCULAR; INTRAVENOUS at 08:48

## 2024-09-02 RX ADMIN — OXYCODONE HYDROCHLORIDE 5 MG: 5 TABLET ORAL at 18:08

## 2024-09-02 ASSESSMENT — PAIN DESCRIPTION - PAIN TYPE
TYPE: ACUTE PAIN;SURGICAL PAIN
TYPE: ACUTE PAIN
TYPE: ACUTE PAIN;SURGICAL PAIN
TYPE: ACUTE PAIN;SURGICAL PAIN
TYPE: ACUTE PAIN

## 2024-09-02 ASSESSMENT — EDINBURGH POSTNATAL DEPRESSION SCALE (EPDS)
I HAVE LOOKED FORWARD WITH ENJOYMENT TO THINGS: AS MUCH AS I EVER DID
THINGS HAVE BEEN GETTING ON TOP OF ME: NO, MOST OF THE TIME I HAVE COPED QUITE WELL
THE THOUGHT OF HARMING MYSELF HAS OCCURRED TO ME: NEVER
I HAVE FELT SCARED OR PANICKY FOR NO GOOD REASON: YES, SOMETIMES
I HAVE BEEN ANXIOUS OR WORRIED FOR NO GOOD REASON: YES, SOMETIMES
I HAVE FELT SAD OR MISERABLE: NOT VERY OFTEN
I HAVE BEEN SO UNHAPPY THAT I HAVE HAD DIFFICULTY SLEEPING: NOT AT ALL
I HAVE BEEN ABLE TO LAUGH AND SEE THE FUNNY SIDE OF THINGS: AS MUCH AS I ALWAYS COULD
I HAVE BEEN SO UNHAPPY THAT I HAVE BEEN CRYING: NO, NEVER
I HAVE BLAMED MYSELF UNNECESSARILY WHEN THINGS WENT WRONG: YES, SOME OF THE TIME

## 2024-09-02 NOTE — PROGRESS NOTES
Pt transferred from L&D with infant in arms. Two RN verification of infant and mother completed. Pt oriented to room and unit. Call light and POC discussed. Infant placed skin to skin and breastfeeding initiated.

## 2024-09-02 NOTE — ANESTHESIA TIME REPORT
Anesthesia Start and Stop Event Times       Date Time Event    9/1/2024 1859 Ready for Procedure     1923 Anesthesia Start     2057 Anesthesia Stop          Responsible Staff  09/01/24      Name Role Begin End    Gilbert Rodriguez D.O. Anesth 1923 2057          Overtime Reason:  no overtime (within assigned shift)    Comments:

## 2024-09-02 NOTE — ANESTHESIA PREPROCEDURE EVALUATION
Case: [id number] Date/Time: 24 1800    Procedure: REPEAT  SECTION, BILATERAL SALPINGO OOPHORECTOMY    Pre-op diagnosis: HISTORY OF  SECTION, REQUEST FOR STERILIZATION, BRCA 2 POSITIVE CARRIER - 39 WEEKS    Location: [facility] OR  / SURGERY LABOR AND DELIVERY    Surgeons: Sandrine Montenegro M.D.          Anes H&P:  PAST MEDICAL HISTORY:   39 y.o. female who presents for Procedure(s):  REPEAT  SECTION, BILATERAL SALPINGO OOPHORECTOMY.  She has current and past medical problems significant for:    Past Medical History:   Diagnosis Date    Gestational diabetes mellitus (GDM) in third trimester 2018    hx gestational diabetes    Pregnant     Uterus didelphys        SMOKING/ALCOHOL/RECREATIONAL DRUG USE:  Social History     Tobacco Use    Smoking status: Never     Passive exposure: Current    Smokeless tobacco: Never   Vaping Use    Vaping status: Never Used   Substance Use Topics    Alcohol use: Not Currently    Drug use: No     Social History     Substance and Sexual Activity   Drug Use No       PAST SURGICAL HISTORY:  Past Surgical History:   Procedure Laterality Date    REPEAT C SECTION Bilateral 7/15/2021    Procedure:  SECTION, REPEAT;  Surgeon: Karyn Sainz M.D.;  Location: SURGERY LABOR AND DELIVERY;  Service: Labor and Delivery    REPEAT C SECTION N/A 2018    Procedure: REPEAT C SECTION;  Surgeon: Annika Morton M.D.;  Location: LABOR AND DELIVERY;  Service: Labor and Delivery    PRIMARY C SECTION  2011    Performed by WILMER REYES at LABOR AND DELIVERY       ALLERGIES:   No Known Allergies    MEDICATIONS:  No current facility-administered medications on file prior to encounter.     Current Outpatient Medications on File Prior to Encounter   Medication Sig Dispense Refill    Prenatal MV-Min-Fe Fum-FA-DHA (PRENATAL 1 PO) Take  by mouth every day.      vitamin D2, Ergocalciferol, (DRISDOL) 1.25 MG (08759 UT) Cap capsule Take  by mouth every day.       Progesterone 100 MG Suppos Insert  into the vagina every day.      benzonatate (TESSALON) 100 MG Cap Take 1 Capsule by mouth 3 times a day as needed for Cough. (Patient not taking: Reported on 5/28/2024) 60 Capsule 0       LABS:  Lab Results   Component Value Date/Time    HEMOGLOBIN 13.3 09/01/2024 1530    HEMATOCRIT 38.8 09/01/2024 1530    WBC 9.5 09/01/2024 1530     Lab Results   Component Value Date/Time    SODIUM 133 (L) 05/29/2024 1157    POTASSIUM 4.2 05/29/2024 1157    CHLORIDE 100 05/29/2024 1157    CO2 19 (L) 05/29/2024 1157    GLUCOSE 92 05/29/2024 1157    BUN 9 05/29/2024 1157    CALCIUM 9.9 05/29/2024 1157         PREVIOUS ANESTHETICS: See EMR  __________________________________________    Relevant Problems   OB   (positive) Gestational diabetes       Physical Exam    Airway   Mallampati: II  TM distance: >3 FB  Neck ROM: full       Cardiovascular - normal exam  Rhythm: regular  Rate: normal  (-) murmur     Dental - normal exam           Pulmonary - normal exam  Breath sounds clear to auscultation     Abdominal    Neurological - normal exam                   Anesthesia Plan    ASA 2       Plan - spinal   Neuraxial block will be primary anesthetic                Postoperative Plan: Postoperative administration of opioids is intended.    Pertinent diagnostic labs and testing reviewed    Informed Consent:    Anesthetic plan and risks discussed with patient.

## 2024-09-02 NOTE — PROGRESS NOTES
, due , 39w2d    1509 Pt presents to L&D for scheduled C/S. Pt reports feeling some intermittent cramping but states this has been normal for her for some time. Pt denies any LOF or VB and reports +FM. EFM & TOCO applied. VSS.     1700 Report given to ISHAN Payan. POC discussed, care relinquished.

## 2024-09-02 NOTE — CARE PLAN
The patient is Stable - Low risk of patient condition declining or worsening    Shift Goals  Clinical Goals: lochia light, pt will ambulate to and from restroom, catheter removal and void q6 post removal.  Patient Goals: rest, bond with infant and feed q3  Family Goals: To bond.    Progress made toward(s) clinical / shift goals:    Problem: Knowledge Deficit - Postpartum  Goal: Patient will verbalize and demonstrate understanding of self and infant care  Outcome: Progressing     Problem: Early Mobilization - Post Surgery  Goal: Early mobilization post surgery  Outcome: Progressing     Problem: Pain - Standard  Goal: Alleviation of pain or a reduction in pain to the patient’s comfort goal  Outcome: Progressing       Patient is not progressing towards the following goals:

## 2024-09-02 NOTE — ANESTHESIA PROCEDURE NOTES
Spinal Block    Date/Time: 9/1/2024 7:27 PM    Performed by: Gilbert Rodriguez D.O.  Authorized by: Gilbert Rodriguez D.O.    Patient Location:  OB  Start Time:  9/1/2024 7:27 PM  Reason for Block: primary anesthetic    patient identified, IV checked, site marked, risks and benefits discussed, surgical consent, monitors and equipment checked, pre-op evaluation and timeout performed    Patient Position:  Sitting  Prep: ChloraPrep, patient draped and sterile technique    Monitoring:  Blood pressure, continuous pulse oximetry and heart rate  Approach:  Midline  Location:  L3-4  Injection Technique:  Single-shot  Skin infiltration:  Lidocaine  Strength:  1%  Dose:  3ml  Needle Type:  Pencan  Needle Gauge:  25 G  CSF flowing pre/post injection:  Yes  Sensory Level:  T4   1 pass, no apparent complications

## 2024-09-02 NOTE — ANESTHESIA POSTPROCEDURE EVALUATION
Patient: Raisa Roscom Razonable    Procedure Summary       Date: 24 Room / Location: [facility] OR 01 / SURGERY LABOR AND DELIVERY    Anesthesia Start:  Anesthesia Stop:     Procedure: REPEAT  SECTION, BILATERAL SALPINGO OOPHORECTOMY Diagnosis: (HISTORY OF  SECTION, REQUEST FOR STERILIZATION, BRCA 2 POSITIVE CARRIER - 39 WEEKS)    Surgeons: Sandrine Montenegro M.D. Responsible Provider: Gilbert Rodriguez D.O.    Anesthesia Type: spinal ASA Status: 2            Final Anesthesia Type: spinal  Last vitals  BP   Blood Pressure: 123/74    Temp   36.7 °C (98 °F)    Pulse   62   Resp   18    SpO2   97 %      Anesthesia Post Evaluation    Patient location during evaluation: PACU  Patient participation: complete - patient participated  Level of consciousness: awake and alert    Airway patency: patent  Anesthetic complications: no  Cardiovascular status: hemodynamically stable  Respiratory status: acceptable  Hydration status: euvolemic    PONV: none    patient able to participate, but full recovery from regional anesthesia has not occurred and is not expected within the stipulated timeframe for the completion of the evaluation      No notable events documented.     Nurse Pain Score: 0 (NPRS)

## 2024-09-02 NOTE — OP REPORT
"DATE OF SERVICE:  2024     PREOPERATIVE DIAGNOSES:  38yo  at 39w2d  Prior  x 3  Desires sterilization - for BSO today  BRCA 2 carrier  AMA  Hx of  delivery x 2  Bicornuate uterus  Fetal VSD     POSTOPERATIVE DIAGNOSES:  Same    PROCEDURE PERFORMED:    Repeat low transverse  section.   Scar revision.  Bilateral salpingo-oophorectomy.  Excision of vulvar skin lesion     SURGEON:  Sandrine Montenegro MD     ASSISTANT: Malik Banks MD     ANESTHESIA:  Spinal.     ANESTHESIOLOGIST:  Gilbert Rodriguez DO     SPECIMEN:  Bilateral tubes and ovaries, vulvar skin lesion     ESTIMATED BLOOD LOSS:  700 mL     FINDINGS:  A viable male infant named \"Gallito\", weight 3370 grams, 7lb 7oz, Apgars of 8 and 9 in vertex presentation with clear amniotic fluid.  There was a normal uterus, tubes, and ovaries bilaterally. Hypertrophic pfannenstiel incision.  1cm dark brown/black raised lesion on left mons pubis inferior to skin incision.      COMPLICATIONS:  None.     PROCEDURE:  After appropriate consents were obtained, the patient was taken to the operating room where spinal anesthesia was applied without complications.  The patient was then prepped and draped in the usual sterile manner.  Clamp test on the skin verified adequate anesthesia.  A Pfannenstiel incision was made with a scalpel 3cm superior to the pubic symphysis in the prior  scar site. This scar was hypertrophic and so the old scar was excised on its superior and inferior end and removed. The incision was then extended down to the rectus fascia.  The rectus fascia was incised with the scalpel and tented up. The underlying rectus muscle was  from the fascia first inferiorly and then superiorly using the turcios scissors.  The rectus muscle was  in the midline with a scalpel.  The peritoneum was entered sharply in the midline. The peritoneum incision was extended superiorly and inferiorly with the Metzenbaum scissors with " great care to avoid injury to underlying bowel or bladder.  Roberto retractor was placed. The vesicouterine peritoneum was tented up and entered with Metzenbaum scissors, and a bladder flap was created.  An incision was made into the lower uterine segment transversely and the incision was extended bluntly.  Amniotomy was performed and there was noted to be clear amniotic fluid. The Infant's head was grasped and delivered atraumatically followed by the remainder of the body without any complications.  The mouth and nares were suctioned. The cord was doubly clamped and cut and the infant was handed off to awaiting neonatology team.  The placenta was then allowed to spontaneously deliver. The uterus was cleared of clots and debris.  The hysterotomy incision was reapproximated with 1-0 Vicryl suture in a running locked fashion. There were some sites of bleeding on the incision which were made hemostatic w/ 1-0 Vicryl suture in figure of eights.  Hemostasis was noted. The uterus was then exteriorized. The tubes and ovaries were examined and noted to be normal.  Bilateral salpingo-oophorectomy was then performed.  The IP ligaments were identified and clamped with miguel angel clamps. The utero-ovarian pedicles were also clamped with Kocher clamps. The IP ligament was then cut and double suture ligated with 1-0 Vicryl suture. The utero-ovarian pedicles were also suture ligated with 1-0 Vicryl suture. The tubes and ovaries were handed off for pathology specimen. There were some small areas of bleeding on the lateral uterus in the broad ligament. This was made hemostatic with 1-0 Vicryl suture figure of eight stitches and also with the hand held Ligasure device.  The pedicles were cauterized with the Ligasure for additional hemostasis. The pedicles were irrigated and observed for hemostasis. Hemostasis was noted. The uterus was gently returned to the abdomen. The pelvis was irrigated with normal saline. The pericolic gutters were  examined and any blood clots were removed.  The pedicles were all examined again and noted to have hemostasis. The Roberto retractor was removed. The peritoneum was reapproximated with 3-0 Vicryl suture running.  The rectus muscles were examined and hemostatic.  The fascia was reapproximated with 0 Vicryl suture running.  The subcutaneous fat was irrigated and any small bleeders were bovied for hemostasis.  The subcutaneous fat was then reapproximated with 3-0 Vicryl suture running.  The skin was reapproximated with 4-0 Monocryl suture running. Steri strips and a dressing were placed.  The vulvar skin lesion inferior to the incision was then grasped with forceps and a shave excision was performed on the skin lesion.  The lesion was handed off for pathology.  The site was then closed with interrupted 4-0 Vicryl suture x 2. Hemostasis was noted.  Sponge, needle, instrument, and lap counts were correct x2.  Patient tolerated the procedure well and went to recovery room in stable condition.           ____________________________________     Sandrine Montenegro MD

## 2024-09-02 NOTE — PROGRESS NOTES
S:  Pt doing well, no c/o, lochia small, pain controlled, not yet passing gas, smith not yet removed, baby doing well, desires breast feeding    O:  VSS AF        Gen - NAD        Lung - normal effort, no distress        Abd - approp mild TTP, no peritoneal signs, wound= C/D/I, no s/s infection        Ext - No s/s DVT, nontender         Recent Labs     24  1530 24  0521   WBC 9.5 16.7*   RBC 4.11* 3.35*   HEMOGLOBIN 13.3 10.8*   HEMATOCRIT 38.8 31.4*   MCV 94.4 93.7   MCH 32.4 32.2   RDW 48.7 47.4   PLATELETCT 289 234   MPV 9.7 10.2   NEUTSPOLYS 73.90*  --    LYMPHOCYTES 19.00*  --    MONOCYTES 6.40  --    EOSINOPHILS 0.20  --    BASOPHILS 0.20  --         A:  POD#1 s/p repeat  w/ BSO (due to BRCA 2 carrier status) - doing well postpartum         P:  Continue routine post partum care, ambulation encouraged, may shower today, d/c home 1-2 days

## 2024-09-02 NOTE — CARE PLAN
The patient is Stable - Low risk of patient condition declining or worsening    Shift Goals  Clinical Goals: Pt will remain clinically stable.  Patient Goals: To rest, bond, and breastfeed.  Family Goals: To bond.    Progress made toward(s) clinical / shift goals:    Problem: Knowledge Deficit - L&D  Goal: Patient and family/caregivers will demonstrate understanding of plan of care, disease process/condition, diagnostic tests and medications  Outcome: Progressing     Problem: Risk for Excess Fluid Volume  Goal: Patient will demonstrate pulse, blood pressure and neurologic signs within expected ranges and without any respiratory complications  Outcome: Progressing     Problem: Knowledge Deficit - Standard  Goal: Patient and family/care givers will demonstrate understanding of plan of care, disease process/condition, diagnostic tests and medications  Outcome: Progressing     Problem: Knowledge Deficit - Postpartum  Goal: Patient will verbalize and demonstrate understanding of self and infant care  Outcome: Progressing     Problem: Psychosocial - Postpartum  Goal: Patient will verbalize and demonstrate effective bonding and parenting behavior  Outcome: Progressing     Problem: Altered Physiologic Condition  Goal: Patient physiologically stable as evidenced by normal lochia, palpable uterine involution and vitals within normal limits  Outcome: Progressing     Problem: Infection - Postpartum  Goal: Postpartum patient will be free of signs and symptoms of infection  Outcome: Progressing     Problem: Respiratory/Oxygenation Function Post-Surgical  Goal: Patient will achieve/maintain normal respiratory rate/effort  Outcome: Progressing     Problem: Bowel Elimination - Post Surgical  Goal: Patient will resume regular bowel sounds and function with no discomfort or distention  Outcome: Progressing     Problem: Early Mobilization - Post Surgery  Goal: Early mobilization post surgery  Outcome: Progressing       Patient is not  progressing towards the following goals:

## 2024-09-02 NOTE — LACTATION NOTE
This note was copied from a baby's chart.  Initial LC visit, mother latched infant independently, active suck with occasional swallows, mother reports latch feels comfortable. Did not breast feed with other children, reports they did not latch, does report her milk came in with her other children then dried up. Reviewed hunger cues, cue based feeding, cluster feeding, milk onset. Offered WIC referral and mother declines, has used services before and is not interested. Plan to continue cue based breastfeeding at least 8 or more times each 24 hours. Mother denies questions/concerns.

## 2024-09-02 NOTE — PROGRESS NOTES
1900: Report received from April RN.  1923: In the OR  1938: Time out  1948: AROM clear fluid.   1948: Delivery of male infant. Apgars 8,9.

## 2024-09-03 ENCOUNTER — PHARMACY VISIT (OUTPATIENT)
Dept: PHARMACY | Facility: MEDICAL CENTER | Age: 39
End: 2024-09-03
Payer: COMMERCIAL

## 2024-09-03 VITALS
SYSTOLIC BLOOD PRESSURE: 98 MMHG | HEART RATE: 62 BPM | HEIGHT: 63 IN | BODY MASS INDEX: 28.35 KG/M2 | TEMPERATURE: 97.1 F | DIASTOLIC BLOOD PRESSURE: 56 MMHG | OXYGEN SATURATION: 97 % | WEIGHT: 160 LBS | RESPIRATION RATE: 18 BRPM

## 2024-09-03 LAB — PATHOLOGY CONSULT NOTE: NORMAL

## 2024-09-03 PROCEDURE — A9270 NON-COVERED ITEM OR SERVICE: HCPCS | Performed by: OBSTETRICS & GYNECOLOGY

## 2024-09-03 PROCEDURE — 700102 HCHG RX REV CODE 250 W/ 637 OVERRIDE(OP): Performed by: OBSTETRICS & GYNECOLOGY

## 2024-09-03 PROCEDURE — RXMED WILLOW AMBULATORY MEDICATION CHARGE: Performed by: OBSTETRICS & GYNECOLOGY

## 2024-09-03 RX ORDER — OXYCODONE HYDROCHLORIDE 5 MG/1
5 TABLET ORAL EVERY 4 HOURS PRN
Qty: 10 TABLET | Refills: 0 | Status: SHIPPED | OUTPATIENT
Start: 2024-09-03 | End: 2024-09-08

## 2024-09-03 RX ORDER — PSEUDOEPHEDRINE HCL 30 MG
100 TABLET ORAL 2 TIMES DAILY
Qty: 60 CAPSULE | Refills: 0 | Status: SHIPPED | OUTPATIENT
Start: 2024-09-03

## 2024-09-03 RX ORDER — IBUPROFEN 800 MG/1
800 TABLET, FILM COATED ORAL EVERY 8 HOURS PRN
Qty: 30 TABLET | Refills: 0 | Status: SHIPPED | OUTPATIENT
Start: 2024-09-03

## 2024-09-03 RX ORDER — ACETAMINOPHEN 500 MG
500-1000 TABLET ORAL EVERY 6 HOURS PRN
COMMUNITY
Start: 2024-09-03

## 2024-09-03 RX ADMIN — PRENATAL WITH FERROUS FUM AND FOLIC ACID 1 TABLET: 3080; 920; 120; 400; 22; 1.84; 3; 20; 10; 1; 12; 200; 27; 25; 2 TABLET ORAL at 08:13

## 2024-09-03 RX ADMIN — ACETAMINOPHEN 1000 MG: 500 TABLET ORAL at 01:28

## 2024-09-03 RX ADMIN — DOCUSATE SODIUM 100 MG: 100 CAPSULE, LIQUID FILLED ORAL at 05:45

## 2024-09-03 RX ADMIN — ACETAMINOPHEN 1000 MG: 500 TABLET ORAL at 08:13

## 2024-09-03 RX ADMIN — IBUPROFEN 800 MG: 800 TABLET, FILM COATED ORAL at 10:21

## 2024-09-03 RX ADMIN — IBUPROFEN 800 MG: 800 TABLET, FILM COATED ORAL at 03:04

## 2024-09-03 ASSESSMENT — PAIN DESCRIPTION - PAIN TYPE
TYPE: ACUTE PAIN;SURGICAL PAIN
TYPE: SURGICAL PAIN
TYPE: ACUTE PAIN;SURGICAL PAIN
TYPE: SURGICAL PAIN
TYPE: ACUTE PAIN;SURGICAL PAIN
TYPE: SURGICAL PAIN
TYPE: ACUTE PAIN;SURGICAL PAIN

## 2024-09-03 NOTE — CARE PLAN
The patient is Stable - Low risk of patient condition declining or worsening    Shift Goals  Clinical Goals: VSS  Patient Goals: rest, bond with infant and feed q3  Family Goals: To bond.    Progress made toward(s) clinical / shift goals:        Problem: Early Mobilization - Post Surgery  Goal: Early mobilization post surgery  Outcome: Progressing  NOTE: Discussed pain management and verbalization of pain utilizing the 0-10 pain scale. White board updated with times of pain medication availability and pt requests pain medication be provided as scheduled and will call for medication as needed. Discussed non-pharmacological pain control therapies including splinting with a pillow and light abdominal stretching. Abdominal binder ordered per request. Pt ambulates with a steady gait and demonstrates the ability to participate in ADLs and provide care for .      Problem: Altered Physiologic Condition  Goal: Patient physiologically stable as evidenced by normal lochia, palpable uterine involution and vitals within normal limits  Outcome: Progressing  NOTE: Patient is physiologically stable as evidenced by scant lochia rubra, firm fundus at the umbilicus, and vital signs within defined parameters.        Patient is not progressing towards the following goals: NA

## 2024-09-03 NOTE — PROGRESS NOTES
1255: Discharge education provided to patient, including follow up information. All questions answered at this time, patient verbalizes understanding. Paperwork and narcotic consent form signed and dated at this time.      1410: Patient discharged from unit, escorted by staff.

## 2024-09-03 NOTE — DISCHARGE INSTRUCTIONS

## 2024-09-03 NOTE — LACTATION NOTE
This note was copied from a baby's chart.  Follow-up LC visit, mother is now breast and formula feeding in combination per her desired feeding plan. Reviewed supplemental feeding volume guidelines for formula use and encouraged paced bottle feeding to prevent breast refusal. Encouraged breast prior to formula feeding as often and mother is willing and reviewed demand/supply mechanism of breast milk production. Reviewed outpatient lactation resources for ongoing outpatient support if desired, again declines [facility] referral. Plan to ensure baby feeds well by breast and/or bottle at least 8 or more times each 24 hours. Mother denies questions/concerns.

## 2024-09-03 NOTE — DISCHARGE SUMMARY
Discharge Summary:     Date of Admission: 2024  Date of Discharge: 24      Admitting diagnosis:    1. Pregnancy @ 39w2d  2.  History of  x 3  3.  Request for sterilization  4.  BRCA2 gene abnormality      Discharge Diagnosis:   1. Status post repeat low-transverse  delivery with bilateral salpingo-oophorectomy.  2.  BRCA2 gene abnormality      Past Medical History:   Diagnosis Date    Gestational diabetes mellitus (GDM) in third trimester 2018    hx gestational diabetes    Pregnant     Uterus didelphys      OB History    Para Term  AB Living   6 4 2 2 2 3   SAB IAB Ectopic Molar Multiple Live Births   2       0 3      # Outcome Date GA Lbr Jeff/2nd Weight Sex Type Anes PTL Lv   6 Term 24 39w2d  3.37 kg (7 lb 6.9 oz) M CS-LTranv Spinal  VIVIANA   5 Term 07/15/21 39w1d  2.92 kg (6 lb 7 oz) F CS-LTranv Spinal N VIVIANA   4  18 34w6d   F CS-LTranv Spinal N    3  11 25w0d  0.454 kg (1 lb) F CS-Unspec Spinal Y VIVIANA   2 SAB            1 SAB              Past Surgical History:   Procedure Laterality Date    REPEAT C SECTOIN WITH SALPINGECTOMY Bilateral 2024    Procedure: REPEAT  SECTION, BILATERAL SALPINGO OOPHORECTOMY;  Surgeon: Sandrine Montenegro M.D.;  Location: [facility];  Service: Labor and Delivery    REPEAT C SECTION Bilateral 7/15/2021    Procedure:  SECTION, REPEAT;  Surgeon: Karyn Sainz M.D.;  Location: [facility];  Service: Labor and Delivery    REPEAT C SECTION N/A 2018    Procedure: REPEAT C SECTION;  Surgeon: Annika Morton M.D.;  Location: LABOR AND DELIVERY;  Service: Labor and Delivery    PRIMARY C SECTION  2011    Performed by WILMER REYES at LABOR AND DELIVERY     Allergies: Patient has no known allergies.    Hospital Course:   Pt is a 39 y.o. now  who presented for scheduled repeat  delivery with desired bilateral salpingo-oophorectomy  secondary to BRCA2 mutation.  The patient's surgery was uncomplicated with an EBL of 700 cc.  The patient also had a small mons lesion excised intraoperatively.  The patient gave birth to a viable male infant with Apgars of 8 and 9, weight 3370 g.  The patient's postoperative course was unremarkable, she remained afebrile with normal vital signs.  On postoperative day #2 the patient felt ready for discharge. On day of discharge pt was ambulating, voiding spontaneously, tolerating PO, passing flatus, with adequate pain control, and desiring to go home.    Physical Exam:  Temp:  [36.1 °C (97 °F)-37.2 °C (99 °F)] 36.2 °C (97.1 °F)  Pulse:  [62-73] 62  Resp:  [15-18] 18  BP: ()/(56-70) 98/56  SpO2:  [95 %-97 %] 97 %  Gen: AAO, NAD  Resp: ctab  CV: RRR  Abd: soft, NT, ND, fundus firm below umbilicus; +BS   Incision: Mepilex dressing in place, C/D/I  Ext: NT, no edema bilaterally    Current Facility-Administered Medications   Medication Dose    ondansetron (Zofran) syringe/vial injection 4 mg  4 mg    Or    ondansetron (Zofran ODT) dispertab 4 mg  4 mg    oxytocin (Pitocin) infusion (for post delivery)  125 mL/hr    lactated ringers infusion      ibuprofen (Motrin) tablet 800 mg  800 mg    Followed by    [START ON 9/6/2024] ibuprofen (Motrin) tablet 800 mg  800 mg    acetaminophen (Tylenol) tablet 1,000 mg  1,000 mg    Followed by    [START ON 9/6/2024] acetaminophen (Tylenol) tablet 1,000 mg  1,000 mg    oxyCODONE immediate-release (Roxicodone) tablet 5 mg  5 mg    oxyCODONE immediate release (Roxicodone) tablet 10 mg  10 mg    diphenhydrAMINE (Benadryl) tablet/capsule 25 mg  25 mg    Or    diphenhydrAMINE (Benadryl) injection 25 mg  25 mg    docusate sodium (Colace) capsule 100 mg  100 mg    magnesium hydroxide (Milk Of Magnesia) suspension 30 mL  30 mL    prenatal plus vitamin (Stuartnatal 1+1) 27-1 MG tablet 1 Tablet  1 Tablet    simethicone (Mylicon) chewable tablet 125 mg  125 mg       Recent Labs      09/01/24  1530 09/02/24  0521   WBC 9.5 16.7*   RBC 4.11* 3.35*   HEMOGLOBIN 13.3 10.8*   HEMATOCRIT 38.8 31.4*   MCV 94.4 93.7   MCH 32.4 32.2   MCHC 34.3 34.4   RDW 48.7 47.4   PLATELETCT 289 234   MPV 9.7 10.2         Activity/ Discharge Instructions::   Discharge to home  Pelvic Rest x 6 weeks  No heavy lifting x4 weeks  Call or come to ED for: heavy vaginal bleeding, fever >100.4, severe abdominal pain, severe headache, chest pain, shortness of breath,  N/V, abnormal vaginal discharge, incisional drainage, or other concerns.       Follow up:  1-2wks for incision check, 5-6wks for PP visit with primary OB    Discharge Meds:      Medication List        START taking these medications        Instructions   acetaminophen 500 MG Tabs  Commonly known as: Tylenol   Take 1-2 Tablets by mouth every 6 hours as needed for Mild Pain.  Dose: 500-1,000 mg     docusate sodium 100 MG Caps   Take 100 mg by mouth 2 times a day.  Dose: 100 mg     ibuprofen 800 MG Tabs  Commonly known as: Motrin   Take 1 Tablet by mouth every 8 hours as needed for Moderate Pain.  Dose: 800 mg     oxyCODONE immediate-release 5 MG Tabs  Commonly known as: Roxicodone   Take 1 Tablet by mouth every four hours as needed for Severe Pain for up to 5 days.  Dose: 5 mg            CONTINUE taking these medications        Instructions   PRENATAL 1 PO   Take  by mouth every day.     vitamin D2 (Ergocalciferol) 1.25 MG (78523 UT) Caps capsule  Commonly known as: Drisdol   Take  by mouth every day.            STOP taking these medications      benzonatate 100 MG Caps  Commonly known as: Tessalon     Progesterone 100 MG Supp                 Isabel Leija MD  [facility]

## 2024-09-03 NOTE — PROGRESS NOTES
0813: Assumed care of patient, assessment completed. Fundus is firm at one below umbilicus with scant lochia rubra. Patient reports 3/10 pain at this time, scheduled medication given see MAR. Plan of care discussed, patient verbalized understanding.

## 2024-11-13 ENCOUNTER — APPOINTMENT (OUTPATIENT)
Dept: RADIOLOGY | Facility: MEDICAL CENTER | Age: 39
End: 2024-11-13
Attending: OBSTETRICS & GYNECOLOGY
Payer: MEDICAID

## 2024-11-13 DIAGNOSIS — Z15.01 GENETIC SUSCEPTIBILITY TO MALIGNANT NEOPLASM OF BREAST: ICD-10-CM

## 2024-11-13 DIAGNOSIS — Z15.09 GENETIC SUSCEPTIBILITY TO OTHER MALIGNANT NEOPLASM: ICD-10-CM

## 2024-11-13 DIAGNOSIS — Z12.31 VISIT FOR SCREENING MAMMOGRAM: ICD-10-CM

## 2024-11-13 PROCEDURE — 77067 SCR MAMMO BI INCL CAD: CPT

## 2024-12-06 ENCOUNTER — OFFICE VISIT (OUTPATIENT)
Dept: SURGERY | Facility: MEDICAL CENTER | Age: 39
End: 2024-12-06
Payer: MEDICAID

## 2024-12-06 VITALS
OXYGEN SATURATION: 95 % | TEMPERATURE: 96.7 F | HEIGHT: 63 IN | HEART RATE: 65 BPM | DIASTOLIC BLOOD PRESSURE: 92 MMHG | BODY MASS INDEX: 25.16 KG/M2 | WEIGHT: 142 LBS | SYSTOLIC BLOOD PRESSURE: 122 MMHG

## 2024-12-06 DIAGNOSIS — Z80.41 FAMILY HISTORY OF OVARIAN CANCER: ICD-10-CM

## 2024-12-06 PROCEDURE — 3074F SYST BP LT 130 MM HG: CPT | Performed by: SURGERY

## 2024-12-06 PROCEDURE — 3080F DIAST BP >= 90 MM HG: CPT | Performed by: SURGERY

## 2024-12-06 PROCEDURE — 99204 OFFICE O/P NEW MOD 45 MIN: CPT | Performed by: SURGERY

## 2024-12-06 RX ORDER — ESTRADIOL 0.5 MG/1
TABLET ORAL
COMMUNITY
Start: 2024-12-01

## 2024-12-06 ASSESSMENT — FIBROSIS 4 INDEX: FIB4 SCORE: 0.62

## 2024-12-06 ASSESSMENT — ENCOUNTER SYMPTOMS
DIZZINESS: 1
DIAPHORESIS: 1

## 2024-12-06 NOTE — PROGRESS NOTES
Catracho Nichols is a 39 y.o. female who presents for evaluation of a BRCA2 variant of unknown significance.  She reports no current specific breast concerns.    Routine self breast exams: Yes  Breast pain: No   Nipple discharge: No   Skin changes: No   Masses: No   Contour/nipple changes: No   Previous breast biopsy or surgery: No     Age at menarche: 13  Age at menopause: 39 (surgical BSO)  Age at first birth: 26    Hormone replacement therapy: Yes (estrogen only, ongoing since 2024).    Family history of cancer: Mother ovarian cancer age 51, with known RAD51 gene mutation. PU prostate cancer.    Lifetime (5-yr) breast cancer risk calculations  ED/Tyrer-Cuzick v8: 8.6 % (0.5 %)    Genetic testing results  BRCA2 variant of unknown significance (VUS) : c.[id number].    Imaging  - Screening mammogram ([facility]) 2024: Bilateral benign calcifications, no suspicious findings. BIRADS 2, density C.  All imaging personally reviewed (internal and external images).    Pathology  No breast pathology in the system.    Past Medical History   Past Medical History:   Diagnosis Date    Gestational diabetes mellitus (GDM) in third trimester 2018    hx gestational diabetes    Pregnant     Uterus didelphys        Surgical History  Past Surgical History:   Procedure Laterality Date    REPEAT C SECTOIN WITH SALPINGECTOMY Bilateral 2024    Procedure: REPEAT  SECTION, BILATERAL SALPINGO OOPHORECTOMY;  Surgeon: Sandrine Montenegro M.D.;  Location: [facility] DEL[facility];  Service: Labor and Delivery    REPEAT C SECTION Bilateral 7/15/2021    Procedure:  SECTION, REPEAT;  Surgeon: Karyn Sainz M.D.;  Location: SUR[facility]ERY LAB[facility] DEL[facility];  Service: Labor and Delivery    REPEAT C SECTION N/A 2018    Procedure: REPEAT C SECTION;  Surgeon: Annika Morton M.D.;  Location: LABOR AND DELIVERY;  Service: Labor and Delivery    PRIMARY C SECTION  2011    Performed by  "AMY WILMER at LABOR AND DELIVERY       Family History  Family History   Problem Relation Age of Onset    Ovarian Cancer Mother 51    Prostate cancer Paternal Uncle     Diabetes Maternal Grandmother        Social History  Social History     Socioeconomic History    Marital status:      Spouse name: Not on file    Number of children: Not on file    Years of education: Not on file    Highest education level: Not on file   Occupational History    Not on file   Tobacco Use    Smoking status: Never     Passive exposure: Current    Smokeless tobacco: Never   Vaping Use    Vaping status: Never Used   Substance and Sexual Activity    Alcohol use: Not Currently    Drug use: No    Sexual activity: Yes     Partners: Male     Birth control/protection: Condom   Other Topics Concern    Not on file   Social History Narrative    Not on file     Social Drivers of Health     Financial Resource Strain: Not on file   Food Insecurity: Not on file   Transportation Needs: Not on file   Physical Activity: Not on file   Stress: Not on file   Social Connections: Not on file   Intimate Partner Violence: Not on file   Housing Stability: Not on file       Review of Systems  Review of Systems   Constitutional:  Positive for diaphoresis.   Neurological:  Positive for dizziness.   All other systems reviewed and are negative.       Objective   BP (!) 122/92 (BP Location: Left arm, Patient Position: Sitting, BP Cuff Size: Large adult)   Pulse 65   Temp 35.9 °C (96.7 °F) (Temporal)   Ht 1.6 m (5' 3\")   Wt 64.4 kg (142 lb)   SpO2 95%   BMI 25.15 kg/m²    Physical Exam  Vitals and nursing note reviewed.   Constitutional:       General: She is not in acute distress.     Appearance: Normal appearance.   HENT:      Head: Normocephalic and atraumatic.      Right Ear: External ear normal.      Left Ear: External ear normal.      Nose: Nose normal.      Mouth/Throat:      Pharynx: Oropharynx is clear.   Eyes:      General: No scleral " icterus.     Conjunctiva/sclera: Conjunctivae normal.   Cardiovascular:      Rate and Rhythm: Normal rate and regular rhythm.      Heart sounds: Normal heart sounds. No murmur heard.     No friction rub. No gallop.   Pulmonary:      Effort: Pulmonary effort is normal. No respiratory distress.      Breath sounds: Normal breath sounds. No wheezing, rhonchi or rales.   Chest:   Breasts:     Tato Score is 5.      Breasts are symmetrical.      Right: Normal. No swelling, bleeding, inverted nipple, mass, nipple discharge or skin change.      Left: Normal. No swelling, bleeding, inverted nipple, mass, nipple discharge or skin change.      Comments: Bilateral breasts examined in the upright and supine positions.  No suspicious skin changes (erythema, peau d'orange).  No unexpected contour abnormalities.  Bilateral breast tissue heterogeneously dense with no dominant masses or nodules.  Bilateral nipples everted without expressible discharge.  No palpable cervical, supraclavicular, or axillary adenopathy bilaterally.  Abdominal:      General: Abdomen is flat. There is no distension.      Palpations: Abdomen is soft. There is no mass.   Musculoskeletal:         General: No swelling or deformity. Normal range of motion.      Cervical back: Neck supple.   Lymphadenopathy:      Cervical: No cervical adenopathy.      Upper Body:      Right upper body: No supraclavicular or axillary adenopathy.      Left upper body: No supraclavicular or axillary adenopathy.   Skin:     General: Skin is warm and dry.      Capillary Refill: Capillary refill takes less than 2 seconds.   Neurological:      General: No focal deficit present.      Mental Status: She is alert and oriented to person, place, and time.   Psychiatric:         Mood and Affect: Mood normal.         Behavior: Behavior normal.         Thought Content: Thought content normal.         Judgment: Judgment normal.         [facility] ECOG Performance Status categories: 0= Fully active,  able to carry on all pre-disease performance without restriction.  FUNCTIONAL ASSESSMENT  Patient responses to questions:  - Have you ever had shoulder surgery or been treated for a shoulder injury that resulted in a loss of range of motion?  No   - Are you unable to reach into an upper cabinet and retrieve a cup or plate?  No   - Do you have any limitations in daily activities because of your shoulder?  No   Overhead raise test for shoulder flexion:  Functional minimum:  125-149 degrees    Assessment & Plan   The patient is a delightful 39 year old woman with a BRCA2 VUS, but no known actionable or deleterious genetic mutations.  We discussed that VUSs do not impart a significant increase in the risk of breast cancer, and most are eventually reclassified as benign variants.  I confirmed that her particular variant remains a VUS and has not been reclassified using [facility] today and gave her a printout of these results.  I also gave her a copy of the [facility] VUS explanation handout.  With her mother's history of a deleterious RAD51 mutation, I do recommend formal genetic counseling and testing for this mutation; referral placed to [employer].    We will see her back in 6 months for repeat clinical breast exam.  If her subsequent genetic testing is negative, she will remain in the average-risk pool and may resume annual screening mammograms and clinical breast exams with primary care/gynecology and return to see us thereafter on an as-needed basis.    The patient has the following potential barriers to care:  No identified barriers today.    A total of 45 minutes were spent on and with this patient today, including review of records, independent review of imaging, history and physical exam, counseling, documentation of exam, and coordination of care.      Problem   Family History of Ovarian Cancer    Mother ovarian cancer age 51  - Deleterious RAD51 mutation

## 2025-01-13 ENCOUNTER — NON-PROVIDER VISIT (OUTPATIENT)
Dept: GENETICS | Facility: MEDICAL CENTER | Age: 40
End: 2025-01-13
Payer: MEDICAID

## 2025-01-13 NOTE — PROGRESS NOTES
Raisa Nichols is a 39 y.o. female here for a non-provider visit for: Lab Draws  on 1/13/2025 at 11:35 AM    Procedure Performed: Venipuncture     Anatomical site: Right Antecubital Area (AC)    Equipment used: 25 butterfly    Labs drawn: [employer] (two lavender EDTA tubes)    Ordering Provider: [facility]    Harlan By: Jenna Sharpe, Med Ass't

## 2025-06-27 ENCOUNTER — OFFICE VISIT (OUTPATIENT)
Age: 40
End: 2025-06-27
Payer: MEDICAID

## 2025-06-27 VITALS
HEIGHT: 63 IN | BODY MASS INDEX: 24.63 KG/M2 | OXYGEN SATURATION: 98 % | WEIGHT: 139 LBS | TEMPERATURE: 97.1 F | HEART RATE: 58 BPM

## 2025-06-27 DIAGNOSIS — Z80.41 FAMILY HISTORY OF OVARIAN CANCER: Primary | ICD-10-CM

## 2025-06-27 ASSESSMENT — FIBROSIS 4 INDEX: FIB4 SCORE: .6324555320336758664

## 2025-06-27 NOTE — PROGRESS NOTES
"    SUBJECTIVE:   Raisa Nichols is a delightful 40 y.o. female who presents in follow up for clinical breast exam.  Currently, she reports no new breast concerns.  There are no changes in her functional status and she is overall doing well.      Interval Imaging:  - No new breast imaging.    Genetic testing results:  - No deleterious or actionable mutations.  BRCA2 variant likely benign, not actionable.  Does not have a deleterious mutation in the RAD51C/D gene her mother has.  The patient was given a printed copy of her genetic panel testing results.    OBJECTIVE:  Pulse (!) 58   Temp 36.2 °C (97.1 °F) (Temporal)   Ht 1.6 m (5' 3\")   Wt 63 kg (139 lb)   SpO2 98%   BMI 24.62 kg/m²    General: Alert, oriented, pleasant, no acute distress.  HEENT: Extraocular movements intact, no scleral icterus or conjunctival injection.  Chest: Respirations unlabored on room air.  Abdomen: Soft, nondistended.  Extremities: Warm, well-perfused.  Breasts: Bilateral breasts examined in the upright and supine positions.  No suspicious skin findings on either side (erythema, peau d'orange).  Bilateral breasts are heterogeneously dense without dominant masses or nodules.  Bilateral nipples everted without expressible discharge.  No unexpected contour abnormalities.  No palpable cervical, supraclavicular, or axillary adenopathy.     [facility] [place] ECOG Performance Status categories: 0= Fully active, able to carry on all pre-disease performance without restriction.  FUNCTIONAL ASSESSMENT  Patient responses to questions:  - Have you ever had shoulder surgery or been treated for a shoulder injury that resulted in a loss of range of motion?  No   - Are you unable to reach into an upper cabinet and retrieve a cup or plate?  No   - Do you have any limitations in daily activities because of your shoulder?  No   Overhead raise test for shoulder flexion:  Normal Range:  150-180 degrees    ASSESSMENT AND PLAN:  Delightful 40 y.o. female, here " for follow-up of breast cancer risk assessment.  Currently she is doing well, without evidence of malignancy.  No new referrals needed today.  She is not at elevated risk for breast cancer and may resume routine annual screening with annual mammograms and clinical breast exams.  She does not need to schedule a follow up with us in breast surgery unless a new question or concern arises.  All questions answered in detail.      Problem   Family History of Ovarian Cancer    Mother ovarian cancer age 51  - Deleterious RAD51 mutation  - Pt panel genetic testing negative for deleterious mutation

## (undated) DEVICE — SUTURE 1 VICRYL PLUS CT-1 - 36 INCH (36/BX)

## (undated) DEVICE — PACK C-SECTION (2EA/CA)

## (undated) DEVICE — CANISTER SUCTION 3000ML MECHANICAL FILTER AUTO SHUTOFF MEDI-VAC NONSTERILE LF DISP (40EA/CA)

## (undated) DEVICE — SUTURE 1 VICRYL PLUS CTX - 36 INCH (36/BX)

## (undated) DEVICE — SUTURE3-0 36IN VCRLY PLS ANTI (36PK/BX)

## (undated) DEVICE — SUTURE 0 GUT-PLAIN (36PK/BX)

## (undated) DEVICE — PENCIL ELECTSURG 10FT HLSTR - WITH BLADE (50EA/CA)

## (undated) DEVICE — TUBING CLEARLINK DUO-VENT - C-FLO (48EA/CA)

## (undated) DEVICE — LACTATED RINGERS INJ 1000 ML - (14EA/CA 60CA/PF)

## (undated) DEVICE — SUTURE 3-0 VICRYL PLUS CT-1 - 36 INCH (36/BX)

## (undated) DEVICE — SUTURE 4-0 MONOCRYL PLUS PS-1 - 27 INCH (36/BX)

## (undated) DEVICE — KIT  I.V. START (100EA/CA)

## (undated) DEVICE — TRAY SPINAL ANESTHESIA NON-SAFETY (10/CA)

## (undated) DEVICE — TAPE CLOTH MEDIPORE 6 INCH - (12RL/CA)

## (undated) DEVICE — GLOVE BIOGEL SZ 6.5 SURGICAL PF LTX (50PR/BX 4BX/CA)

## (undated) DEVICE — BLANKET UNDERBODY FULL ACCES - (5/CA)

## (undated) DEVICE — GLOVE BIOGEL SZ 7 SURGICAL PF LTX - (50PR/BX 4BX/CA)

## (undated) DEVICE — SODIUM CHL IRRIGATION 0.9% 1000ML (12EA/CA)

## (undated) DEVICE — SLEEVE, SEQUENTIAL CALF REG

## (undated) DEVICE — WATER IRRIGATION STERILE 1000ML (12EA/CA)

## (undated) DEVICE — LIGASURE SM JAW SEALER CRVD - (6EA/CA)

## (undated) DEVICE — HEAD HOLDER JUNIOR/ADULT

## (undated) DEVICE — PACK ROOM TURNOVER L&D (12/CA)

## (undated) DEVICE — CATHETER IV NON-SAFETY 18 GA X 1 1/4 (50/BX 4BX/CA)

## (undated) DEVICE — SET EXTENSION WITH 2 PORTS (48EA/CA) ***PART #2C8610 IS A SUBSTITUTE*****

## (undated) DEVICE — CHLORAPREP 26 ML APPLICATOR - ORANGE TINT(25/CA)